# Patient Record
Sex: FEMALE | Race: WHITE | NOT HISPANIC OR LATINO | URBAN - METROPOLITAN AREA
[De-identification: names, ages, dates, MRNs, and addresses within clinical notes are randomized per-mention and may not be internally consistent; named-entity substitution may affect disease eponyms.]

---

## 2019-06-25 ENCOUNTER — INPATIENT (INPATIENT)
Facility: HOSPITAL | Age: 79
LOS: 5 days | Discharge: NURSING, REHAB OR LONG-TERM CARE | DRG: 689 | End: 2019-07-01
Attending: INTERNAL MEDICINE | Admitting: INTERNAL MEDICINE
Payer: MEDICARE

## 2019-06-25 VITALS
TEMPERATURE: 98 F | DIASTOLIC BLOOD PRESSURE: 86 MMHG | RESPIRATION RATE: 20 BRPM | SYSTOLIC BLOOD PRESSURE: 160 MMHG | HEART RATE: 78 BPM | WEIGHT: 110.01 LBS | OXYGEN SATURATION: 97 %

## 2019-06-25 DIAGNOSIS — Z98.890 OTHER SPECIFIED POSTPROCEDURAL STATES: Chronic | ICD-10-CM

## 2019-06-25 DIAGNOSIS — F32.9 MAJOR DEPRESSIVE DISORDER, SINGLE EPISODE, UNSPECIFIED: ICD-10-CM

## 2019-06-25 DIAGNOSIS — N30.00 ACUTE CYSTITIS WITHOUT HEMATURIA: ICD-10-CM

## 2019-06-25 DIAGNOSIS — Z98.890 OTHER SPECIFIED POSTPROCEDURAL STATES: ICD-10-CM

## 2019-06-25 DIAGNOSIS — R62.7 ADULT FAILURE TO THRIVE: ICD-10-CM

## 2019-06-25 DIAGNOSIS — R63.8 OTHER SYMPTOMS AND SIGNS CONCERNING FOOD AND FLUID INTAKE: ICD-10-CM

## 2019-06-25 DIAGNOSIS — H40.9 UNSPECIFIED GLAUCOMA: ICD-10-CM

## 2019-06-25 DIAGNOSIS — F03.90 UNSPECIFIED DEMENTIA WITHOUT BEHAVIORAL DISTURBANCE: ICD-10-CM

## 2019-06-25 DIAGNOSIS — Z29.9 ENCOUNTER FOR PROPHYLACTIC MEASURES, UNSPECIFIED: ICD-10-CM

## 2019-06-25 DIAGNOSIS — R41.0 DISORIENTATION, UNSPECIFIED: ICD-10-CM

## 2019-06-25 LAB
ALBUMIN SERPL ELPH-MCNC: 3.7 G/DL — SIGNIFICANT CHANGE UP (ref 3.3–5)
ALP SERPL-CCNC: 127 U/L — HIGH (ref 40–120)
ALT FLD-CCNC: 10 U/L — SIGNIFICANT CHANGE UP (ref 10–45)
ANION GAP SERPL CALC-SCNC: 14 MMOL/L — SIGNIFICANT CHANGE UP (ref 5–17)
APPEARANCE UR: CLEAR — SIGNIFICANT CHANGE UP
APTT BLD: 26 SEC — LOW (ref 27.5–36.3)
AST SERPL-CCNC: 21 U/L — SIGNIFICANT CHANGE UP (ref 10–40)
BACTERIA # UR AUTO: PRESENT /HPF
BASOPHILS # BLD AUTO: 0.06 K/UL — SIGNIFICANT CHANGE UP (ref 0–0.2)
BASOPHILS NFR BLD AUTO: 1 % — SIGNIFICANT CHANGE UP (ref 0–2)
BILIRUB SERPL-MCNC: 0.7 MG/DL — SIGNIFICANT CHANGE UP (ref 0.2–1.2)
BILIRUB UR-MCNC: NEGATIVE — SIGNIFICANT CHANGE UP
BUN SERPL-MCNC: 17 MG/DL — SIGNIFICANT CHANGE UP (ref 7–23)
CALCIUM SERPL-MCNC: 9.1 MG/DL — SIGNIFICANT CHANGE UP (ref 8.4–10.5)
CHLORIDE SERPL-SCNC: 104 MMOL/L — SIGNIFICANT CHANGE UP (ref 96–108)
CO2 SERPL-SCNC: 24 MMOL/L — SIGNIFICANT CHANGE UP (ref 22–31)
COLOR SPEC: YELLOW — SIGNIFICANT CHANGE UP
CREAT SERPL-MCNC: 1.37 MG/DL — HIGH (ref 0.5–1.3)
DIFF PNL FLD: NEGATIVE — SIGNIFICANT CHANGE UP
EOSINOPHIL # BLD AUTO: 0.39 K/UL — SIGNIFICANT CHANGE UP (ref 0–0.5)
EOSINOPHIL NFR BLD AUTO: 6.3 % — HIGH (ref 0–6)
EPI CELLS # UR: SIGNIFICANT CHANGE UP /HPF (ref 0–5)
FLU A RESULT: SIGNIFICANT CHANGE UP
FLU A RESULT: SIGNIFICANT CHANGE UP
FLUAV AG NPH QL: SIGNIFICANT CHANGE UP
FLUBV AG NPH QL: SIGNIFICANT CHANGE UP
GLUCOSE SERPL-MCNC: 100 MG/DL — HIGH (ref 70–99)
GLUCOSE UR QL: NEGATIVE — SIGNIFICANT CHANGE UP
HCT VFR BLD CALC: 38.9 % — SIGNIFICANT CHANGE UP (ref 34.5–45)
HGB BLD-MCNC: 13 G/DL — SIGNIFICANT CHANGE UP (ref 11.5–15.5)
IMM GRANULOCYTES NFR BLD AUTO: 0.2 % — SIGNIFICANT CHANGE UP (ref 0–1.5)
INR BLD: 0.98 — SIGNIFICANT CHANGE UP (ref 0.88–1.16)
KETONES UR-MCNC: NEGATIVE — SIGNIFICANT CHANGE UP
LEUKOCYTE ESTERASE UR-ACNC: ABNORMAL
LYMPHOCYTES # BLD AUTO: 1.84 K/UL — SIGNIFICANT CHANGE UP (ref 1–3.3)
LYMPHOCYTES # BLD AUTO: 29.9 % — SIGNIFICANT CHANGE UP (ref 13–44)
MAGNESIUM SERPL-MCNC: 1.9 MG/DL — SIGNIFICANT CHANGE UP (ref 1.6–2.6)
MCHC RBC-ENTMCNC: 31.3 PG — SIGNIFICANT CHANGE UP (ref 27–34)
MCHC RBC-ENTMCNC: 33.4 GM/DL — SIGNIFICANT CHANGE UP (ref 32–36)
MCV RBC AUTO: 93.7 FL — SIGNIFICANT CHANGE UP (ref 80–100)
MONOCYTES # BLD AUTO: 0.4 K/UL — SIGNIFICANT CHANGE UP (ref 0–0.9)
MONOCYTES NFR BLD AUTO: 6.5 % — SIGNIFICANT CHANGE UP (ref 2–14)
NEUTROPHILS # BLD AUTO: 3.46 K/UL — SIGNIFICANT CHANGE UP (ref 1.8–7.4)
NEUTROPHILS NFR BLD AUTO: 56.1 % — SIGNIFICANT CHANGE UP (ref 43–77)
NITRITE UR-MCNC: NEGATIVE — SIGNIFICANT CHANGE UP
NRBC # BLD: 0 /100 WBCS — SIGNIFICANT CHANGE UP (ref 0–0)
PH UR: 7 — SIGNIFICANT CHANGE UP (ref 5–8)
PLATELET # BLD AUTO: 165 K/UL — SIGNIFICANT CHANGE UP (ref 150–400)
POTASSIUM SERPL-MCNC: 3.5 MMOL/L — SIGNIFICANT CHANGE UP (ref 3.5–5.3)
POTASSIUM SERPL-SCNC: 3.5 MMOL/L — SIGNIFICANT CHANGE UP (ref 3.5–5.3)
PROT SERPL-MCNC: 6.9 G/DL — SIGNIFICANT CHANGE UP (ref 6–8.3)
PROT UR-MCNC: ABNORMAL MG/DL
PROTHROM AB SERPL-ACNC: 11.1 SEC — SIGNIFICANT CHANGE UP (ref 10–12.9)
RBC # BLD: 4.15 M/UL — SIGNIFICANT CHANGE UP (ref 3.8–5.2)
RBC # FLD: 14.6 % — HIGH (ref 10.3–14.5)
RBC CASTS # UR COMP ASSIST: < 5 /HPF — SIGNIFICANT CHANGE UP
RSV RESULT: SIGNIFICANT CHANGE UP
RSV RNA RESP QL NAA+PROBE: SIGNIFICANT CHANGE UP
SODIUM SERPL-SCNC: 142 MMOL/L — SIGNIFICANT CHANGE UP (ref 135–145)
SP GR SPEC: 1.01 — SIGNIFICANT CHANGE UP (ref 1–1.03)
UROBILINOGEN FLD QL: 0.2 E.U./DL — SIGNIFICANT CHANGE UP
WBC # BLD: 6.16 K/UL — SIGNIFICANT CHANGE UP (ref 3.8–10.5)
WBC # FLD AUTO: 6.16 K/UL — SIGNIFICANT CHANGE UP (ref 3.8–10.5)
WBC UR QL: ABNORMAL /HPF

## 2019-06-25 PROCEDURE — 93010 ELECTROCARDIOGRAM REPORT: CPT

## 2019-06-25 PROCEDURE — 70450 CT HEAD/BRAIN W/O DYE: CPT | Mod: 26

## 2019-06-25 PROCEDURE — 99285 EMERGENCY DEPT VISIT HI MDM: CPT | Mod: 25

## 2019-06-25 PROCEDURE — 71045 X-RAY EXAM CHEST 1 VIEW: CPT | Mod: 26

## 2019-06-25 RX ORDER — ONDANSETRON 8 MG/1
4 TABLET, FILM COATED ORAL ONCE
Refills: 0 | Status: COMPLETED | OUTPATIENT
Start: 2019-06-25 | End: 2019-06-25

## 2019-06-25 RX ORDER — ATORVASTATIN CALCIUM 80 MG/1
20 TABLET, FILM COATED ORAL
Qty: 0 | Refills: 0 | DISCHARGE

## 2019-06-25 RX ORDER — CEFTRIAXONE 500 MG/1
1000 INJECTION, POWDER, FOR SOLUTION INTRAMUSCULAR; INTRAVENOUS EVERY 24 HOURS
Refills: 0 | Status: COMPLETED | OUTPATIENT
Start: 2019-06-26 | End: 2019-06-27

## 2019-06-25 RX ORDER — ASPIRIN/CALCIUM CARB/MAGNESIUM 324 MG
0 TABLET ORAL
Qty: 0 | Refills: 0 | DISCHARGE

## 2019-06-25 RX ORDER — SODIUM CHLORIDE 9 MG/ML
1000 INJECTION INTRAMUSCULAR; INTRAVENOUS; SUBCUTANEOUS ONCE
Refills: 0 | Status: COMPLETED | OUTPATIENT
Start: 2019-06-25 | End: 2019-06-25

## 2019-06-25 RX ORDER — LATANOPROST 0.05 MG/ML
1 SOLUTION/ DROPS OPHTHALMIC; TOPICAL ONCE
Refills: 0 | Status: COMPLETED | OUTPATIENT
Start: 2019-06-25 | End: 2019-06-25

## 2019-06-25 RX ORDER — FLUOXETINE HCL 10 MG
50 CAPSULE ORAL DAILY
Refills: 0 | Status: DISCONTINUED | OUTPATIENT
Start: 2019-06-25 | End: 2019-07-01

## 2019-06-25 RX ORDER — SODIUM CHLORIDE 9 MG/ML
1000 INJECTION INTRAMUSCULAR; INTRAVENOUS; SUBCUTANEOUS
Refills: 0 | Status: DISCONTINUED | OUTPATIENT
Start: 2019-06-25 | End: 2019-06-27

## 2019-06-25 RX ORDER — ATORVASTATIN CALCIUM 80 MG/1
20 TABLET, FILM COATED ORAL AT BEDTIME
Refills: 0 | Status: DISCONTINUED | OUTPATIENT
Start: 2019-06-25 | End: 2019-07-01

## 2019-06-25 RX ORDER — LATANOPROST 0.05 MG/ML
1 SOLUTION/ DROPS OPHTHALMIC; TOPICAL
Qty: 0 | Refills: 0 | DISCHARGE

## 2019-06-25 RX ORDER — LATANOPROST 0.05 MG/ML
1 SOLUTION/ DROPS OPHTHALMIC; TOPICAL AT BEDTIME
Refills: 0 | Status: DISCONTINUED | OUTPATIENT
Start: 2019-06-25 | End: 2019-07-01

## 2019-06-25 RX ORDER — FLUOXETINE HCL 10 MG
0 CAPSULE ORAL
Qty: 0 | Refills: 0 | DISCHARGE

## 2019-06-25 RX ORDER — ASPIRIN/CALCIUM CARB/MAGNESIUM 324 MG
81 TABLET ORAL
Qty: 0 | Refills: 0 | DISCHARGE

## 2019-06-25 RX ORDER — CEFTRIAXONE 500 MG/1
1000 INJECTION, POWDER, FOR SOLUTION INTRAMUSCULAR; INTRAVENOUS ONCE
Refills: 0 | Status: COMPLETED | OUTPATIENT
Start: 2019-06-25 | End: 2019-06-25

## 2019-06-25 RX ORDER — ASPIRIN/CALCIUM CARB/MAGNESIUM 324 MG
81 TABLET ORAL DAILY
Refills: 0 | Status: DISCONTINUED | OUTPATIENT
Start: 2019-06-25 | End: 2019-07-01

## 2019-06-25 RX ORDER — ATORVASTATIN CALCIUM 80 MG/1
0 TABLET, FILM COATED ORAL
Qty: 0 | Refills: 0 | DISCHARGE

## 2019-06-25 RX ORDER — FLUOXETINE HCL 10 MG
50 CAPSULE ORAL
Qty: 0 | Refills: 0 | DISCHARGE

## 2019-06-25 RX ORDER — ONDANSETRON 8 MG/1
4 TABLET, FILM COATED ORAL ONCE
Refills: 0 | Status: DISCONTINUED | OUTPATIENT
Start: 2019-06-25 | End: 2019-06-25

## 2019-06-25 RX ADMIN — ONDANSETRON 4 MILLIGRAM(S): 8 TABLET, FILM COATED ORAL at 07:52

## 2019-06-25 RX ADMIN — Medication 1 MILLIGRAM(S): at 07:57

## 2019-06-25 RX ADMIN — Medication 50 MILLIGRAM(S): at 13:51

## 2019-06-25 RX ADMIN — ATORVASTATIN CALCIUM 20 MILLIGRAM(S): 80 TABLET, FILM COATED ORAL at 23:22

## 2019-06-25 RX ADMIN — CEFTRIAXONE 1000 MILLIGRAM(S): 500 INJECTION, POWDER, FOR SOLUTION INTRAMUSCULAR; INTRAVENOUS at 09:50

## 2019-06-25 RX ADMIN — CEFTRIAXONE 100 MILLIGRAM(S): 500 INJECTION, POWDER, FOR SOLUTION INTRAMUSCULAR; INTRAVENOUS at 09:21

## 2019-06-25 RX ADMIN — LATANOPROST 1 DROP(S): 0.05 SOLUTION/ DROPS OPHTHALMIC; TOPICAL at 13:46

## 2019-06-25 RX ADMIN — Medication 81 MILLIGRAM(S): at 13:47

## 2019-06-25 RX ADMIN — SODIUM CHLORIDE 2000 MILLILITER(S): 9 INJECTION INTRAMUSCULAR; INTRAVENOUS; SUBCUTANEOUS at 07:52

## 2019-06-25 NOTE — ED PROVIDER NOTE - OBJECTIVE STATEMENT
77 y/o female presents to the ED by ambulance complaining of non-bloody diarrhea, nausea and generalized weakness. Pt was agitated upon arrival and unable to give a clear history; only with complaints of nausea and notes "feeling awful".

## 2019-06-25 NOTE — H&P ADULT - NSHPSOCIALHISTORY_GEN_ALL_CORE
Current smoker, 1-2 cigarettes/day, ~100 pack year history  Denies alcohol use  Denies illicit drug use

## 2019-06-25 NOTE — ED ADULT TRIAGE NOTE - CHIEF COMPLAINT QUOTE
pt BIBA from home complaining of generalized weakness nausea without vomiting and constipation for unknown amount of time states whole body hurts cannot stand due to pain but denies falls. Pt denies CP SOB is anxious and refusing to answer further questions because she feels sick

## 2019-06-25 NOTE — H&P ADULT - NSHPPHYSICALEXAM_GEN_ALL_CORE
VITAL SIGNS:  T(C): 36.7 (06-25-19 @ 08:54), Max: 36.7 (06-25-19 @ 08:54)  T(F): 98 (06-25-19 @ 08:54), Max: 98 (06-25-19 @ 08:54)  HR: 63 (06-25-19 @ 08:54) (63 - 78)  BP: 157/87 (06-25-19 @ 08:54) (157/87 - 160/86)  BP(mean): --  RR: 18 (06-25-19 @ 08:54) (18 - 20)  SpO2: 95% (06-25-19 @ 08:54) (95% - 97%)  Wt(kg): --    PHYSICAL EXAM:  Constitutional: WDWN resting comfortably in bed; NAD, elderly, thin  Head: NC/AT  Eyes: PERRL, EOMI, anicteric sclera  ENT: no nasal discharge; uvula midline, no oropharyngeal erythema or exudates; dry MM, poor dentition  Neck: supple; no JVD or thyromegaly  Respiratory: CTA B/L; no W/R/R, no retractions, poor air entry  Cardiac: +S1/S2; RRR; no M/R/G; PMI non-displaced  Gastrointestinal: abdomen soft and thin, LLQ tenderness, ND; no rebound or guarding; +BSx4  Back: spine midline, no bony tenderness or step-offs; no CVAT B/L  Extremities: WWP, no clubbing or cyanosis; no peripheral edema  Musculoskeletal: NROM x4; no joint swelling, tenderness or erythema  Vascular: 2+ radial, DP/PT pulses B/L  Dermatologic: skin warm, dry and intact; no rashes, wounds, or scars  Lymphatic: no submandibular or cervical LAD  Neurologic: AAOx2 (person and place, oriented to president; CNII-XII grossly intact; no focal deficits, strength 4+/5 in b/l UE and LE  Psychiatric: affect and characteristics of appearance, verbalizations, behaviors are appropriate

## 2019-06-25 NOTE — H&P ADULT - PROBLEM SELECTOR PLAN 5
Patient has known history of dementia that is worsening per pt's nephew. Pt is A&Ox2 at baseline. Lives at home and has HHA 5 hours a day for 7 days.  - SW consult

## 2019-06-25 NOTE — H&P ADULT - PROBLEM SELECTOR PLAN 3
Pt's nephew reports poor PO intake and weight loss in the patient. Nephew reports that she eats very little. Pt has dry mucous membranes and is thin on exam.  - Nutrition consult  - start NS @ 75cc/hr

## 2019-06-25 NOTE — H&P ADULT - NSICDXPASTMEDICALHX_GEN_ALL_CORE_FT
PAST MEDICAL HISTORY:  Anxiety     Cataract     Dementia     Depression     Glaucoma     HLD (hyperlipidemia)     Tobacco use disorder

## 2019-06-25 NOTE — H&P ADULT - HISTORY OF PRESENT ILLNESS
Patient is a 78yoF current smoker with pmhx dementia, depression, anxiety, suicided attempt in 2016, HLD, cataracts and glaucoma who presents from home, BIBEMS to [facility] ED for weakness and nausea. Patient's nephew (Willian Hamilton, HCP) is at bedside giving patient's history. Pt states that she was feeling awful and having nausea at home. Nephew reports that when HHA was home, pt had episode of diarrhea then had a few more episodes later in the day after HHA left. Nephew states that the pt called him and told him about her symptoms and he told her to call EMS to bring her to the ED. Nephew reports that the pt has been declining in the past few years and especially since she was hospitalized at [facility] in August for constipation, malnutrition and dehydration. Nephew also reports that pt had been in a nursing home then went home with a 5 hour a day HHA. Nephew does not feel that the pt has enough attention at home and is concerned that she needs more help. Nephew also reports that she has a poor appetite and limited PO intake, eating mostly cheese sandwiches at home.    In the ED, vitals were T 98, HR 63, /87, RR 18, spO2 95% on RA. Labs were remarkable for Eosinophils 6.3%, Cr 1.37, Alk Phos 127, UA with trace protein, large LE, many WBCs and bacteria. CTH was negative for acute hemorrhage or infarct. Pt was given 1g ceftriaxone, 1mg Ativan for agitation 4mg Zofran and 1L NS.

## 2019-06-25 NOTE — ED PROVIDER NOTE - PROGRESS NOTE DETAILS
Able to contact nephew, Willian Hamilton and obtained rest of her history.  As per nephew, patient has history of with PMHx of anxiety, cataracts, dementia, depression, glaucoma, HLD, and tobacco use disorder; nephew states pt experienced 2-3 episodes of diarrhea over the past few days as well as general weakness. Nephew states he went to visit patient at which time was complaining of generalized weakness. Patient has health care provider at home five hours a day for seven days; she does not have 24 hour help at home. Able to contact nephew, Willian Hamilton and obtained rest of her history.  As per nephew, patient has history of with PMHx of anxiety, cataracts, dementia, depression, glaucoma, HLD, and tobacco use disorder; nephew states pt experienced 2-3 episodes of nb diarrhea over the past few days as well as general weakness. Nephew states he went to visit patient at which time was complaining of generalized weakness. Patient has health care provider at home five hours a day for seven days; she does not have 24 hour help at home.

## 2019-06-25 NOTE — ED ADULT NURSE NOTE - OBJECTIVE STATEMENT
Patient presents to the ED with c/o severe nausea as well as other unspecified complaints. Tearful presentation with irritability during interview. No active emesis. States "im going to have another accident, I have had a couple over the last few days. Unable to obtain specific details. MD at bedside. NAD Noted

## 2019-06-25 NOTE — PROGRESS NOTE ADULT - SUBJECTIVE AND OBJECTIVE BOX
78 y.o female depression anxiety oms cigs htn chol cva carotid endarterectomy admitted with uti started on iv antibioics hemodynamically stable alert comfortable agitation noted lungs clear heart s1s2 2/6 shanel lpsb and apex bp 130/80 will require social service for placement in snf discussed with family and staff

## 2019-06-25 NOTE — PROVIDER CONTACT NOTE (OTHER) - SITUATION
Patient is a new admission from the ED. Patient stated that she wants to hurt herself and wants to hurt others but does not have a plan.

## 2019-06-25 NOTE — H&P ADULT - PROBLEM SELECTOR PLAN 7
Nephew reports remote history of endarterectomy. Pt takes ASA 81mg and Atorvastatin 20mg at home.  - c/w home meds

## 2019-06-25 NOTE — H&P ADULT - ASSESSMENT
Patient is a 78yoF current smoker with pmhx dementia, depression, anxiety, suicided attempt in 2016, HLD, cataracts and glaucoma who presents from home, BIBEMS to [facility] ED for weakness and nausea. Pt admitted for management of UTI.

## 2019-06-25 NOTE — H&P ADULT - NSHPLABSRESULTS_GEN_ALL_CORE
LABS:                         13.0   6.16  )-----------( 165      ( 2019 07:34 )             38.9     142  |  104  |  17  ----------------------------<  100<H>  3.5   |  24  |  1.37<H>  Ca    9.1      2019 07:34  Mg     1.9       TPro  6.9  /  Alb  3.7  /  TBili  0.7  /  DBili  x   /  AST  21  /  ALT  10  /  AlkPhos  127<H>    PT/INR - ( 2019 07:34 )   PT: 11.1 sec;   INR: 0.98     PTT - ( 2019 07:34 )  PTT:26.0 sec    Urinalysis Basic - ( 2019 07:48 )  Color: Yellow / Appearance: Clear / S.010 / pH: x  Gluc: x / Ketone: NEGATIVE  / Bili: Negative / Urobili: 0.2 E.U./dL   Blood: x / Protein: Trace mg/dL / Nitrite: NEGATIVE   Leuk Esterase: Large / RBC: < 5 /HPF / WBC Many /HPF   Sq Epi: x / Non Sq Epi: 0-5 /HPF / Bacteria: Present /HPF      CARDIAC MARKERS ( 2019 07:34 )  x     / <0.01 ng/mL / 34 U/L / x     / 2.0 ng/mL    RADIOLOGY, EKG & ADDITIONAL TESTS: Reviewed.   < from: CT Head No Cont (19 @ 08:38) >  IMPRESSION: No intracranial hemorrhage or acute transcortical infarct.   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 Old lacunar   infarct within the right caudate head.

## 2019-06-25 NOTE — ED PROVIDER NOTE - CLINICAL SUMMARY MEDICAL DECISION MAKING FREE TEXT BOX
77 y/o female with PMHx of anxiety, dementia, HLD  presents with non-bloody diarrhea, nausea and generalized weakness. Patient initially agitated and uncooperative; history obtained after discussion with nephew. Administered IV antibiotics for UTI and given Ativan for agitation. Patient admitted after discussion with nephew for UTI and possible placement due to home conditions. 79 y/o female with PMHx of anxiety, dementia, HLD  presents with non-bloody diarrhea, nausea and generalized weakness. Patient initially agitated and uncooperative; history obtained after discussion with nephew. Administered IV antibiotics for UTI and given po Ativan for agitation. Patient admitted for UTI and possible placement. Stable in ED.

## 2019-06-25 NOTE — H&P ADULT - PROBLEM SELECTOR PLAN 4
Patient has history of depression. Nephew reports that pt had suicide attempt by overdose in 2016 for which she was hospitalized for 2 weeks. Patient currently takes Przac 50mg at home. No SI/HI.  - c/w home Prozac 50mg

## 2019-06-25 NOTE — H&P ADULT - PROBLEM SELECTOR PLAN 2
Patient presented with confusion in the setting of known dementia. Pt has returned to baseline mental status, per pt's nephew at bedside. CTH negative for acute hemorrhage or infarct.  - continue to monitor mental status

## 2019-06-25 NOTE — H&P ADULT - PROBLEM SELECTOR PLAN 1
Patient presented with confusion and nausea. Pt denies dysuria or increased urinary frequency. Found to have UTI on UA. S/p 1L NS and 1g CTX in the ED.  - c/w 1g CTX [id number] Patient presented with confusion and nausea. Pt denies dysuria or increased urinary frequency. Found to have UTI on UA. S/p 1L NS and 1g CTX in the ED.  - c/w 1g CTX [id number]  - f/u urine cx

## 2019-06-26 DIAGNOSIS — Z91.89 OTHER SPECIFIED PERSONAL RISK FACTORS, NOT ELSEWHERE CLASSIFIED: ICD-10-CM

## 2019-06-26 LAB
ALBUMIN SERPL ELPH-MCNC: 3 G/DL — LOW (ref 3.3–5)
ALP SERPL-CCNC: 100 U/L — SIGNIFICANT CHANGE UP (ref 40–120)
ALT FLD-CCNC: 8 U/L — LOW (ref 10–45)
ANION GAP SERPL CALC-SCNC: 9 MMOL/L — SIGNIFICANT CHANGE UP (ref 5–17)
AST SERPL-CCNC: 13 U/L — SIGNIFICANT CHANGE UP (ref 10–40)
BILIRUB SERPL-MCNC: 0.5 MG/DL — SIGNIFICANT CHANGE UP (ref 0.2–1.2)
BUN SERPL-MCNC: 17 MG/DL — SIGNIFICANT CHANGE UP (ref 7–23)
CALCIUM SERPL-MCNC: 8.3 MG/DL — LOW (ref 8.4–10.5)
CHLORIDE SERPL-SCNC: 108 MMOL/L — SIGNIFICANT CHANGE UP (ref 96–108)
CO2 SERPL-SCNC: 26 MMOL/L — SIGNIFICANT CHANGE UP (ref 22–31)
CREAT SERPL-MCNC: 1.29 MG/DL — SIGNIFICANT CHANGE UP (ref 0.5–1.3)
GLUCOSE SERPL-MCNC: 88 MG/DL — SIGNIFICANT CHANGE UP (ref 70–99)
HCT VFR BLD CALC: 31.4 % — LOW (ref 34.5–45)
HGB BLD-MCNC: 10.5 G/DL — LOW (ref 11.5–15.5)
MAGNESIUM SERPL-MCNC: 1.7 MG/DL — SIGNIFICANT CHANGE UP (ref 1.6–2.6)
MCHC RBC-ENTMCNC: 31.4 PG — SIGNIFICANT CHANGE UP (ref 27–34)
MCHC RBC-ENTMCNC: 33.4 GM/DL — SIGNIFICANT CHANGE UP (ref 32–36)
MCV RBC AUTO: 94 FL — SIGNIFICANT CHANGE UP (ref 80–100)
NRBC # BLD: 0 /100 WBCS — SIGNIFICANT CHANGE UP (ref 0–0)
PLATELET # BLD AUTO: 137 K/UL — LOW (ref 150–400)
POTASSIUM SERPL-MCNC: 3.1 MMOL/L — LOW (ref 3.5–5.3)
POTASSIUM SERPL-SCNC: 3.1 MMOL/L — LOW (ref 3.5–5.3)
PROT SERPL-MCNC: 5.7 G/DL — LOW (ref 6–8.3)
RBC # BLD: 3.34 M/UL — LOW (ref 3.8–5.2)
RBC # FLD: 14.8 % — HIGH (ref 10.3–14.5)
SODIUM SERPL-SCNC: 143 MMOL/L — SIGNIFICANT CHANGE UP (ref 135–145)
WBC # BLD: 5.47 K/UL — SIGNIFICANT CHANGE UP (ref 3.8–10.5)
WBC # FLD AUTO: 5.47 K/UL — SIGNIFICANT CHANGE UP (ref 3.8–10.5)

## 2019-06-26 RX ORDER — MAGNESIUM SULFATE 500 MG/ML
4 VIAL (ML) INJECTION ONCE
Refills: 0 | Status: COMPLETED | OUTPATIENT
Start: 2019-06-26 | End: 2019-06-26

## 2019-06-26 RX ORDER — POTASSIUM CHLORIDE 20 MEQ
10 PACKET (EA) ORAL
Refills: 0 | Status: COMPLETED | OUTPATIENT
Start: 2019-06-26 | End: 2019-06-26

## 2019-06-26 RX ORDER — POTASSIUM CHLORIDE 20 MEQ
40 PACKET (EA) ORAL ONCE
Refills: 0 | Status: COMPLETED | OUTPATIENT
Start: 2019-06-26 | End: 2019-06-26

## 2019-06-26 RX ADMIN — Medication 100 MILLIEQUIVALENT(S): at 10:57

## 2019-06-26 RX ADMIN — LATANOPROST 1 DROP(S): 0.05 SOLUTION/ DROPS OPHTHALMIC; TOPICAL at 00:02

## 2019-06-26 RX ADMIN — Medication 81 MILLIGRAM(S): at 12:24

## 2019-06-26 RX ADMIN — Medication 50 MILLIGRAM(S): at 12:24

## 2019-06-26 RX ADMIN — CEFTRIAXONE 100 MILLIGRAM(S): 500 INJECTION, POWDER, FOR SOLUTION INTRAMUSCULAR; INTRAVENOUS at 09:01

## 2019-06-26 RX ADMIN — Medication 100 GRAM(S): at 13:32

## 2019-06-26 RX ADMIN — Medication 100 MILLIEQUIVALENT(S): at 09:36

## 2019-06-26 RX ADMIN — LATANOPROST 1 DROP(S): 0.05 SOLUTION/ DROPS OPHTHALMIC; TOPICAL at 22:05

## 2019-06-26 RX ADMIN — ATORVASTATIN CALCIUM 20 MILLIGRAM(S): 80 TABLET, FILM COATED ORAL at 22:05

## 2019-06-26 RX ADMIN — Medication 40 MILLIEQUIVALENT(S): at 09:17

## 2019-06-26 RX ADMIN — Medication 100 MILLIEQUIVALENT(S): at 12:24

## 2019-06-26 NOTE — CHART NOTE - NSCHARTNOTEFT_GEN_A_CORE
Upon Nutritional Assessment by the Registered Dietitian your patient was determined to meet criteria / has evidence of the following diagnosis/diagnoses:          [ ]  Mild Protein Calorie Malnutrition        [ ]  Moderate Protein Calorie Malnutrition        [ x] Severe Protein Calorie Malnutrition        [ ] Unspecified Protein Calorie Malnutrition        [ ] Underweight / BMI <19        [ ] Morbid Obesity / BMI > 40    Per physical assessment: Muscle Wasting- Temporal [   ]  Clavicle/Pectoral [ x  ]  Shoulder/Deltoid [  x ]  Scapula [   ]  Interosseous [ x  ]  Quadriceps [   ]  Gastrocnemius [   ]; Fat Wasting- Orbital [   ]  Buccal [   ]  Triceps [x   ]  Rib [   ]--> Suspect severe PCM 2/2 to physical assessment, <75% EER being met >1 month, and suspected wt loss; please see malnutrition chart note.    Findings as based on:  •  Comprehensive nutrition assessment and consultation    Treatment:    The following diet has been recommended:    Soft + Ensure Enlive TID (1050 kcal, 60g protein, 540mL free H2O)     PROVIDER Section:     By signing this assessment you are acknowledging and agree with the diagnosis/diagnoses assigned by the Registered Dietitian    Comments:

## 2019-06-26 NOTE — DIETITIAN INITIAL EVALUATION ADULT. - MALNUTRITION
Per physical assessment: Muscle Wasting- Temporal [   ]  Clavicle/Pectoral [ x  ]  Shoulder/Deltoid [  x ]  Scapula [   ]  Interosseous [ x  ]  Quadriceps [   ]  Gastrocnemius [   ]; Fat Wasting- Orbital [   ]  Buccal [   ]  Triceps [x   ]  Rib [   ]--> Suspect severe PCM 2/2 to physical assessment, <75% EER being met >1 month, and suspected wt loss; please see malnutrition chart note. severe

## 2019-06-26 NOTE — DIETITIAN INITIAL EVALUATION ADULT. - PROBLEM SELECTOR PLAN 1
Patient presented with confusion and nausea. Pt denies dysuria or increased urinary frequency. Found to have UTI on UA. S/p 1L NS and 1g CTX in the ED.  - c/w 1g CTX [id number]  - f/u urine cx

## 2019-06-26 NOTE — DIETITIAN INITIAL EVALUATION ADULT. - OTHER INFO
78F admitted for management of UTI, pt is current smoker with pmhx dementia, depression, anxiety, suicide attempt in 2016, HLD, cataracts and glaucoma who presents from home, BIBEMS to [facility] ED for weakness and nausea. Pt states that she was feeling awful and having nausea at home. Nephew reports that when HHA was home, pt had episode of diarrhea then had a few more episodes later in the day after HHA left. Nephew feels not enough attention at home and concerned she requires more help than she is currently receiving, additionally reports poor appetite at home/ FTT, baseline mental status AAOx2. Pt observed in bed, curled up in ball, did not awaken to name, per PCA pt stating she still lacks appetite, only consuming coffee for lunch. Nephew not present at time of assessment however per visual assessment pt with noteable fat/ muscle wasting, see chart note for PCM. No noted n/v/c, diarrhea noted, prefers soft diet, skin intact, no pain noted. NKFA. Suspect wt loss however pt unable to state UBW. Recommend Ensure Enlive TID (1050 kcal, 60g protein, 540mL free H2O) to further meet needs, appetite stimulant and encouragement/ assistance at meals. Will continue to follow per protocol.

## 2019-06-26 NOTE — PHYSICAL THERAPY INITIAL EVALUATION ADULT - GAIT DEVIATIONS NOTED, PT EVAL
decreased shahab/dec toe clearance, shuffling gait/decreased weight-shifting ability/decreased step length

## 2019-06-26 NOTE — DIETITIAN INITIAL EVALUATION ADULT. - ADD RECOMMEND
1. Encourage intake through day 2. Assist with feeds prn 3. Manage pain prn 4. Add Ensure Enlive TID (1050 kcal, 60g protein, 540mL free H2O)

## 2019-06-26 NOTE — PROGRESS NOTE ADULT - SUBJECTIVE AND OBJECTIVE BOX
77 y/o female htn cva carotid endarterectomy oms agitation uti on antibiotics clinically improving lungs clear heart s1s2 2/6 shanel lpsb and apex bp 140/80 neuro to see social service plan for snf

## 2019-06-26 NOTE — CONSULT NOTE ADULT - SUBJECTIVE AND OBJECTIVE BOX
Patient is a 78y old  Female who presents with a chief complaint of UTI (2019 09:04)       HPI:  Patient is a 78yoF current smoker with pmhx dementia, depression, anxiety, suicided attempt in 2016, HLD, cataracts and glaucoma who presents from home, BIBEMS to [facility] ED for weakness and nausea. Patient's nephew (Willian Hamilton, HCP) is at bedside giving patient's history. Pt states that she was feeling awful and having nausea at home. Nephew reports that when HHA was home, pt had episode of diarrhea then had a few more episodes later in the day after HHA left. Nephew states that the pt called him and told him about her symptoms and he told her to call EMS to bring her to the ED. Nephew reports that the pt has been declining in the past few years and especially since she was hospitalized at [facility] in August for constipation, malnutrition and dehydration. Nephew also reports that pt had been in a nursing home then went home with a 5 hour a day HHA. Nephew does not feel that the pt has enough attention at home and is concerned that she needs more help. Nephew also reports that she has a poor appetite and limited PO intake, eating mostly cheese sandwiches at home.    In the ED, vitals were T 98, HR 63, /87, RR 18, spO2 95% on RA. Labs were remarkable for Eosinophils 6.3%, Cr 1.37, Alk Phos 127, UA with trace protein, large LE, many WBCs and bacteria. CTH was negative for acute hemorrhage or infarct. Pt was given 1g ceftriaxone, 1mg Ativan for agitation 4mg Zofran and 1L NS. (2019 11:38)      PAST MEDICAL & SURGICAL HISTORY:  Glaucoma  Cataract  Tobacco use disorder  HLD (hyperlipidemia)  Anxiety  Depression  Dementia  H/O endarterectomy      MEDICATIONS  (STANDING):  aspirin enteric coated 81 milliGRAM(s) Oral daily  atorvastatin 20 milliGRAM(s) Oral at bedtime  cefTRIAXone   IVPB 1000 milliGRAM(s) IV Intermittent every 24 hours  FLUoxetine 50 milliGRAM(s) Oral daily  latanoprost 0.005% Ophthalmic Solution 1 Drop(s) Both EYES at bedtime  magnesium sulfate  IVPB 4 Gram(s) IV Intermittent once  potassium chloride  10 mEq/100 mL IVPB 10 milliEquivalent(s) IV Intermittent every 1 hour  sodium chloride 0.9%. 1000 milliLiter(s) (75 mL/Hr) IV Continuous <Continuous>    MEDICATIONS  (PRN):      Social History: lives alone in an elevator accessible apartment building, has HHA x 5 hrs/day    Functional Level Prior to Admission: states she is ADl independent, walks with a cane/walker    FAMILY HISTORY:  FH: lung cancer  FH: diabetes mellitus      CBC Full  -  ( 2019 06:31 )  WBC Count : 5.47 K/uL  RBC Count : 3.34 M/uL  Hemoglobin : 10.5 g/dL  Hematocrit : 31.4 %  Platelet Count - Automated : 137 K/uL  Mean Cell Volume : 94.0 fl  Mean Cell Hemoglobin : 31.4 pg  Mean Cell Hemoglobin Concentration : 33.4 gm/dL  Auto Neutrophil # : x  Auto Lymphocyte # : x  Auto Monocyte # : x  Auto Eosinophil # : x  Auto Basophil # : x  Auto Neutrophil % : x  Auto Lymphocyte % : x  Auto Monocyte % : x  Auto Eosinophil % : x  Auto Basophil % : x          143  |  108  |  17  ----------------------------<  88  3.1<L>   |  26  |  1.29    Ca    8.3<L>      2019 06:31  Mg     1.7         TPro  5.7<L>  /  Alb  3.0<L>  /  TBili  0.5  /  DBili  x   /  AST  13  /  ALT  8<L>  /  AlkPhos  100        Urinalysis Basic - ( 2019 07:48 )    Color: Yellow / Appearance: Clear / S.010 / pH: x  Gluc: x / Ketone: NEGATIVE  / Bili: Negative / Urobili: 0.2 E.U./dL   Blood: x / Protein: Trace mg/dL / Nitrite: NEGATIVE   Leuk Esterase: Large / RBC: < 5 /HPF / WBC Many /HPF   Sq Epi: x / Non Sq Epi: 0-5 /HPF / Bacteria: Present /HPF          Radiology:    < from: Xray Chest 1 View-PORTABLE IMMEDIATE (19 @ 07:56) >  EXAM:  XR CHEST PORTABLE IMMED 1V                          PROCEDURE DATE:  2019          INTERPRETATION:    Chest x-ray    Indication: Cough    Prior studies: None    Single AP view of the chest is submitted.  Heart size is mildly enlarged.   Slightly prominent interstitial markings. No focal pulmonary   consolidation is seen.  No pleural effusion or pneumothorax is identified.    Impression: Mild cardiomegaly. Prominent interstitial markings. No focal   consolidation.        < from: CT Head No Cont (19 @ 08:38) >    EXAM:  CT BRAIN                          PROCEDURE DATE:  2019          INTERPRETATION:  INDICATIONS: Weakness    TECHNIQUE: Serial axial images were obtained from the skull base to the   vertex without the use of intravenous contrast. Sagittal and coronal   reformats were also reviewed.    COMPARISON EXAMINATION: None.    FINDINGS:    VENTRICLES AND SULCI: There is mild to moderate parenchymal volume loss   with ex vacuo dilatation of the ventricles.  INTRA-AXIAL: No mass effect, acute hemorrhage, or midline shift. There is   preservation of gray-white matter differentiation without CT evidence of   acute transcortical infarction. There is a wedge-shaped area of   encephalomalacia changes within the posterior right parietal lobe withex   vacuo dilatation of the right atria and occipital horn compatible with   previous infarct. There also is a smaller encephalomalacia focus within   the anterior right frontal lobe compatible with additional infarct. There   is a 5 mm hypodensity within the right caudate head compatible with a old   lacunar infarct. In addition, there are patchy hypodensities within the   cerebral white matter consistent with small vessel ischemic disease.  EXTRA-AXIAL: No extra-axial fluid collection is present.   VISUALIZED SINUSES: Imaged portions of paranasal sinuses are well aerated.  VISUALIZED MASTOIDS: Well-aerated.  CALVARIUM: No acute fracture.    IMPRESSION: No intracranial hemorrhage or acute transcortical infarct.   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 Old lacunar   infarct within the right caudate head.              Vital Signs Last 24 Hrs  T(C): 36.8 (2019 08:57), Max: 36.9 (2019 14:34)  T(F): 98.3 (2019 08:57), Max: 98.5 (2019 14:34)  HR: 62 (2019 08:57) (62 - 75)  BP: 132/75 (2019 08:57) (130/77 - 155/86)  BP(mean): --  RR: 18 (2019 08:57) (16 - 18)  SpO2: 95% (2019 08:57) (95% - 98%)    REVIEW OF SYSTEMS:    CONSTITUTIONAL: fatigue  EYES: No eye pain, visual disturbances, or discharge  ENMT:  No difficulty hearing, tinnitus, vertigo; No sinus or throat pain  NECK: No pain or stiffness  BREASTS: No pain, masses, or nipple discharge  RESPIRATORY: No cough, wheezing, chills or hemoptysis; No shortness of breath  CARDIOVASCULAR: No chest pain, palpitations, dizziness, or leg swelling  GASTROINTESTINAL: No abdominal or epigastric pain. No nausea, vomiting, or hematemesis; No diarrhea or constipation. No melena or hematochezia.  GENITOURINARY: No dysuria, frequency, hematuria, or incontinence  NEUROLOGICAL: No headaches, memory loss, loss of strength, numbness, or tremors  SKIN: No itching, burning, rashes, or lesions   LYMPH NODES: No enlarged glands  ENDOCRINE: No heat or cold intolerance; No hair loss  MUSCULOSKELETAL: No joint pain or swelling; No muscle, back, or extremity pain  PSYCHIATRIC: No depression, anxiety, mood swings, or difficulty sleeping  HEME/LYMPH: No easy bruising, or bleeding gums  ALLERGY AND IMMUNOLOGIC: No hives or eczema  VASCULAR: no swelling, erythema      Physical Exam: 77 yo  woman lying in semi Stockton's position, c/o feeling weak    Head: normocephalic, atraumatic    Eyes: PERRLA, EOMI, no nystagmus, sclera anicteric    ENT: nasal discharge, uvula midline, no oropharyngeal erythema/exudate    Neck: supple, negative JVD, negative carotid bruits, no thyromegaly    Chest: CTA bilaterally, neg wheeze, rhonchi, rales, crackles, egophany    Cardiovascular: regular rate and rhythm, II/VI ELY    Abdomen: soft, non distended, non tender, negative rebound/guarding, normal bowel sounds, neg hepatosplenomegaly    Extremities: WWP, neg cyanosis/clubbing/edema, negative calf tenderness to palpation, negative Cj's sign    :     Neurologic Exam:    Alert and oriented x 2to person, place, speech fluent w/o dysarthria  Cranial Nerves:     II:                       pupils equal, round and reactive to light, visual fields intact   III/ IV/VI:             extraocular movements intact, neg nystagmus, neg ptosis  V:                       facial sensation intact, V1-3 normal  VII:                     face symmetric, no droop, normal eye closure and smile  VIII:                    hearing intact to finger rub bilaterally  IX/ X:                 soft palate rise symmetrical  XI:                      head turning, shoulder shrug normal  XII:                     tongue midline    Motor Exam:    Upper Extremities:     RIght:     poor effort > 3/5               negative drift    Left :      poor effort > 3/5               negative drift    Lower Extremities:                 Right:       poor effort > 3/5                 Left:         poor effort > 3/5                   Sensory:    intact to LT/PP in all UE/LE dermatomes    DTR:            = biceps/     triceps/     brachioradialis                      = patella/   medial hamstring/ankle                      neg clonus                      neg Babinski                          Gait:  not tested        PM&R Impression:    1) deconditioned  2) no focal weakness  3) UTI        Recommendations:    1) Physical therapy focusing on therapeutic exercises, bed mobility/transfer out of bed evaluation, progressive ambulation with assistive devices prn.    2) Anticipated Disposition Plan/Recs: pending functional progress

## 2019-06-26 NOTE — PROGRESS NOTE ADULT - PROBLEM SELECTOR PLAN 1
Patient presented with confusion and nausea. Pt denies dysuria or increased urinary frequency. Found to have UTI on UA. S/p 1L NS and 1g CTX in the ED.  - c/w 1g CTX q24h  - f/u urine cx

## 2019-06-26 NOTE — CONSULT NOTE ADULT - SUBJECTIVE AND OBJECTIVE BOX
Patient is a 78y old  Female who presents with a chief complaint of UTI (26 Jun 2019 07:41)        HPI:  Patient is a 78yoF current smoker with pmhx dementia, depression, anxiety, suicided attempt in 2016, HLD, cataracts and glaucoma who presents from home, BIBEMS to [facility] ED for weakness and nausea. Patient's nephew (Willian Hamilton, HCP) is at bedside giving patient's history. Pt states that she was feeling awful and having nausea at home. Nephew reports that when HHA was home, pt had episode of diarrhea then had a few more episodes later in the day after HHA left. Nephew states that the pt called him and told him about her symptoms and he told her to call EMS to bring her to the ED. Nephew reports that the pt has been declining in the past few years and especially since she was hospitalized at [facility] in August for constipation, malnutrition and dehydration. Nephew also reports that pt had been in a nursing home then went home with a 5 hour a day HHA. Nephew does not feel that the pt has enough attention at home and is concerned that she needs more help. Nephew also reports that she has a poor appetite and limited PO intake, eating mostly cheese sandwiches at home.    In the ED, vitals were T 98, HR 63, /87, RR 18, spO2 95% on RA. Labs were remarkable for Eosinophils 6.3%, Cr 1.37, Alk Phos 127, UA with trace protein, large LE, many WBCs and bacteria. CTH was negative for acute hemorrhage or infarct. Pt was given 1g ceftriaxone, 1mg Ativan for agitation 4mg Zofran and 1L NS. (25 Jun 2019 11:38)    Memory loss - dementia   Allergies  penicillin (Rash)      Health Issues  URINARY TRACT INFECTION;CONFUSION  Yes  FH: lung cancer  FH: diabetes mellitus  Handoff  MEWS Score  Glaucoma  Cataract  Tobacco use disorder  HLD (hyperlipidemia)  Anxiety  Depression  Dementia  Urinary tract infection  Prophylactic measure  Nutrition, metabolism, and development symptoms  History of endarterectomy  Glaucoma  Dementia  Depression  Failure to thrive in adult  Confusion  Acute cystitis without hematuria  H/O endarterectomy  MULITPLE COMPLAINTS  Confusion        FAMILY HISTORY:  FH: lung cancer  FH: diabetes mellitus      MEDICATIONS  (STANDING):  aspirin enteric coated 81 milliGRAM(s) Oral daily  atorvastatin 20 milliGRAM(s) Oral at bedtime  cefTRIAXone   IVPB 1000 milliGRAM(s) IV Intermittent every 24 hours  FLUoxetine 50 milliGRAM(s) Oral daily  latanoprost 0.005% Ophthalmic Solution 1 Drop(s) Both EYES at bedtime  magnesium sulfate  IVPB 4 Gram(s) IV Intermittent once  potassium chloride   Powder 40 milliEquivalent(s) Oral once  potassium chloride  10 mEq/100 mL IVPB 10 milliEquivalent(s) IV Intermittent every 1 hour  sodium chloride 0.9%. 1000 milliLiter(s) (75 mL/Hr) IV Continuous <Continuous>    MEDICATIONS  (PRN):      PAST MEDICAL & SURGICAL HISTORY:  Glaucoma  Cataract  Tobacco use disorder  HLD (hyperlipidemia)  Anxiety  Depression  Dementia  H/O endarterectomy      Labs                          10.5   5.47  )-----------( 137      ( 26 Jun 2019 06:31 )             31.4     06-26    143  |  108  |  17  ----------------------------<  88  3.1<L>   |  26  |  1.29    Ca    8.3<L>      26 Jun 2019 06:31  Mg     1.7     06-26    TPro  5.7<L>  /  Alb  3.0<L>  /  TBili  0.5  /  DBili  x   /  AST  13  /  ALT  8<L>  /  AlkPhos  100  06-26      Radiology:    Physical Exam    MENTAL STATUS  -Level of Consciousness- awake    Orientation- person  Language- aphasia/ dysarthria  Memory- recent and remote- poor      Cranial Nerve 1- 12  Pupils- equal and reactive  Eye movements- full  Facial - no asymmetry       Gait and Station- n/a    MOTOR  Upper- no drift  Lower- no foot drop    Reflexes- decreased    Sensation- no sensory level    Cerebellar- no tremors    vascular - no bruits    Assessment- Dementia    Plan  No further neuro w/o   Consider psych evaluation

## 2019-06-26 NOTE — CONSULT NOTE ADULT - ASSESSMENT
Patient is a 78yoF current smoker with pmhx dementia, depression, anxiety, suicided attempt in 2016, HLD, cataracts and glaucoma who presents from home, BIBEMS to [facility] ED for weakness and nausea. Pt admitted for management of UTI.    Problem/Plan - 1:  ·  Problem: Acute cystitis without hematuria.  Plan: Patient presented with confusion and nausea. Pt denies dysuria or increased urinary frequency. Found to have UTI on UA. S/p 1L NS and 1g CTX in the ED.  - c/w 1g CTX [id number]  - f/u urine cx.    Problem/Plan - 2:  ·  Problem: Confusion.  Plan: Patient presented with confusion in the setting of known dementia. Pt has returned to baseline mental status, per pt's nephew at bedside. CTH negative for acute hemorrhage or infarct.  - continue to monitor mental status.     Problem/Plan - 3:  ·  Problem: Failure to thrive in adult.  Plan: Pt's nephew reports poor PO intake and weight loss in the patient. Nephew reports that she eats very little. Pt has dry mucous membranes and is thin on exam.  - Nutrition consult  - start NS @ 75cc/hr.     Problem/Plan - 4:  ·  Problem: Depression.  Plan: Patient has history of depression. Nephew reports that pt had suicide attempt by overdose in 2016 for which she was hospitalized for 2 weeks. Patient currently takes Przac 50mg at home. No SI/HI.  - c/w home Prozac 50mg.     Problem/Plan - 5:  ·  Problem: Dementia.  Plan: Patient has known history of dementia that is worsening per pt's nephew. Pt is A&Ox2 at baseline. Lives at home and has HHA 5 hours a day for 7 days.  - SW consult.     Problem/Plan - 6:  Problem: Glaucoma. Plan: c/w home Latanoprost ophthalmic solution.    Problem/Plan - 7:  ·  Problem: History of endarterectomy.  Plan: Nephew reports remote history of endarterectomy. Pt takes ASA 81mg and Atorvastatin 20mg at home.  - c/w home meds.     Problem/Plan - 8:  ·  Problem: Nutrition, metabolism, and development symptoms.  Plan: F: start NS @ 75cc/hr  E: replete as needed  N: soft regular diet.     Problem/Plan - 9:  ·  Problem: Prophylactic measure.  Plan: DVT: none (improve score 1)  GI: none    Dispo: RMF  DNR.

## 2019-06-26 NOTE — PHYSICAL THERAPY INITIAL EVALUATION ADULT - PERTINENT HX OF CURRENT PROBLEM, REHAB EVAL
Patient is a 78yoF current smoker with pmhx dementia, depression, anxiety, suicided attempt in 2016, HLD, cataracts and glaucoma who presents from home, BIBEMS to [facility] ED for weakness and nausea.

## 2019-06-26 NOTE — PHYSICAL THERAPY INITIAL EVALUATION ADULT - ADDITIONAL COMMENTS
Per chart review and consulting with case management, patient has home health aide 5 hours/day for 7 days/week, walked independently without any AD. PT was unable to obtain accurate PLOF and history from pt 2/2 her being agitated.

## 2019-06-26 NOTE — DIETITIAN INITIAL EVALUATION ADULT. - ENERGY NEEDS
ABW used for calculations as pt between % of IBW.   ABW 49.9kg, IBW 50kg, 99% IBW, ht 62" (estimated, pt unable to state), BMI 20  Nutrient needs based on [facility] standards of care for maintenance in adults, adjusted for suspected malnutrition

## 2019-06-26 NOTE — PROGRESS NOTE ADULT - SUBJECTIVE AND OBJECTIVE BOX
OVERNIGHT EVENTS: CHALO    SUBJECTIVE: Pt seen and examined at bedside. She was unable to sleep well as people were coming in and out of the room all night, she also is upset she did not get her eye drops last night. Otherwise denies fever, chills, headache, chest pain, shortness of breath, abdominal pain, n/v/d/c, dysuria, numbness, weakness, tingling.     Vital Signs Last 12 Hrs  T(F): 98.3 (19 @ 08:57), Max: 98.3 (19 @ 08:57)  HR: 62 (19 @ 08:57) (62 - 64)  BP: 132/75 (19 @ 08:57) (130/77 - 132/75)  BP(mean): --  RR: 18 (19 @ 08:57) (17 - 18)  SpO2: 95% (19 @ 08:57) (95% - 97%)  I&O's Summary      PHYSICAL EXAM:  Constitutional:  Thin, elderly  female, NAD, comfortable in bed.  HEENT: NC/AT, PERRLA, EOMI, no conjunctival pallor or scleral icterus, slightly dry MM  Neck: Supple, no JVD  Respiratory: Poor effort. CTAB. No w/r/r.   Cardiovascular: RRR, normal S1 and S2, no m/r/g.   Gastrointestinal: +BS, soft NTND, no guarding or rebound tenderness, no palpable masses   Extremities: wwp; no cyanosis, clubbing or edema.   Vascular: Pulses equal and strong throughout.   Neurological: AAOx2 (not date, but knows Jamison is in office), no CN deficits, strength and sensation intact throughout.   Skin: No gross skin abnormalities or rashes        LABS:                        10.5   5.47  )-----------( 137      ( 2019 06:31 )             31.4         143  |  108  |  17  ----------------------------<  88  3.1<L>   |  26  |  1.29    Ca    8.3<L>      2019 06:31  Mg     1.7         TPro  5.7<L>  /  Alb  3.0<L>  /  TBili  0.5  /  DBili  x   /  AST  13  /  ALT  8<L>  /  AlkPhos  100  06-26    PT/INR - ( 2019 07:34 )   PT: 11.1 sec;   INR: 0.98          PTT - ( 2019 07:34 )  PTT:26.0 sec  Urinalysis Basic - ( 2019 07:48 )    Color: Yellow / Appearance: Clear / S.010 / pH: x  Gluc: x / Ketone: NEGATIVE  / Bili: Negative / Urobili: 0.2 E.U./dL   Blood: x / Protein: Trace mg/dL / Nitrite: NEGATIVE   Leuk Esterase: Large / RBC: < 5 /HPF / WBC Many /HPF   Sq Epi: x / Non Sq Epi: 0-5 /HPF / Bacteria: Present /HPF        RADIOLOGY & ADDITIONAL TESTS:    MEDICATIONS  (STANDING):  aspirin enteric coated 81 milliGRAM(s) Oral daily  atorvastatin 20 milliGRAM(s) Oral at bedtime  cefTRIAXone   IVPB 1000 milliGRAM(s) IV Intermittent every 24 hours  FLUoxetine 50 milliGRAM(s) Oral daily  latanoprost 0.005% Ophthalmic Solution 1 Drop(s) Both EYES at bedtime  magnesium sulfate  IVPB 4 Gram(s) IV Intermittent once  potassium chloride  10 mEq/100 mL IVPB 10 milliEquivalent(s) IV Intermittent every 1 hour  sodium chloride 0.9%. 1000 milliLiter(s) (75 mL/Hr) IV Continuous <Continuous>    MEDICATIONS  (PRN):

## 2019-06-27 LAB
-  AMPICILLIN: SIGNIFICANT CHANGE UP
-  CIPROFLOXACIN: SIGNIFICANT CHANGE UP
-  LEVOFLOXACIN: SIGNIFICANT CHANGE UP
-  NITROFURANTOIN: SIGNIFICANT CHANGE UP
-  TETRACYCLINE: SIGNIFICANT CHANGE UP
-  VANCOMYCIN: SIGNIFICANT CHANGE UP
ANION GAP SERPL CALC-SCNC: 10 MMOL/L — SIGNIFICANT CHANGE UP (ref 5–17)
BUN SERPL-MCNC: 18 MG/DL — SIGNIFICANT CHANGE UP (ref 7–23)
CALCIUM SERPL-MCNC: 8.3 MG/DL — LOW (ref 8.4–10.5)
CHLORIDE SERPL-SCNC: 105 MMOL/L — SIGNIFICANT CHANGE UP (ref 96–108)
CO2 SERPL-SCNC: 26 MMOL/L — SIGNIFICANT CHANGE UP (ref 22–31)
CREAT SERPL-MCNC: 1.35 MG/DL — HIGH (ref 0.5–1.3)
CULTURE RESULTS: SIGNIFICANT CHANGE UP
GLUCOSE SERPL-MCNC: 97 MG/DL — SIGNIFICANT CHANGE UP (ref 70–99)
HCT VFR BLD CALC: 33.4 % — LOW (ref 34.5–45)
HGB BLD-MCNC: 11.1 G/DL — LOW (ref 11.5–15.5)
MAGNESIUM SERPL-MCNC: 2.4 MG/DL — SIGNIFICANT CHANGE UP (ref 1.6–2.6)
MCHC RBC-ENTMCNC: 31.4 PG — SIGNIFICANT CHANGE UP (ref 27–34)
MCHC RBC-ENTMCNC: 33.2 GM/DL — SIGNIFICANT CHANGE UP (ref 32–36)
MCV RBC AUTO: 94.4 FL — SIGNIFICANT CHANGE UP (ref 80–100)
METHOD TYPE: SIGNIFICANT CHANGE UP
NRBC # BLD: 0 /100 WBCS — SIGNIFICANT CHANGE UP (ref 0–0)
ORGANISM # SPEC MICROSCOPIC CNT: SIGNIFICANT CHANGE UP
ORGANISM # SPEC MICROSCOPIC CNT: SIGNIFICANT CHANGE UP
PHOSPHATE SERPL-MCNC: 2.9 MG/DL — SIGNIFICANT CHANGE UP (ref 2.5–4.5)
PLATELET # BLD AUTO: 135 K/UL — LOW (ref 150–400)
POTASSIUM SERPL-MCNC: 4 MMOL/L — SIGNIFICANT CHANGE UP (ref 3.5–5.3)
POTASSIUM SERPL-SCNC: 4 MMOL/L — SIGNIFICANT CHANGE UP (ref 3.5–5.3)
RBC # BLD: 3.54 M/UL — LOW (ref 3.8–5.2)
RBC # FLD: 14.7 % — HIGH (ref 10.3–14.5)
SODIUM SERPL-SCNC: 141 MMOL/L — SIGNIFICANT CHANGE UP (ref 135–145)
SPECIMEN SOURCE: SIGNIFICANT CHANGE UP
WBC # BLD: 6.43 K/UL — SIGNIFICANT CHANGE UP (ref 3.8–10.5)
WBC # FLD AUTO: 6.43 K/UL — SIGNIFICANT CHANGE UP (ref 3.8–10.5)

## 2019-06-27 RX ORDER — ACETAMINOPHEN 500 MG
650 TABLET ORAL ONCE
Refills: 0 | Status: COMPLETED | OUTPATIENT
Start: 2019-06-27 | End: 2019-06-27

## 2019-06-27 RX ADMIN — ATORVASTATIN CALCIUM 20 MILLIGRAM(S): 80 TABLET, FILM COATED ORAL at 21:18

## 2019-06-27 RX ADMIN — Medication 81 MILLIGRAM(S): at 13:26

## 2019-06-27 RX ADMIN — Medication 50 MILLIGRAM(S): at 13:26

## 2019-06-27 RX ADMIN — Medication 650 MILLIGRAM(S): at 10:00

## 2019-06-27 RX ADMIN — CEFTRIAXONE 100 MILLIGRAM(S): 500 INJECTION, POWDER, FOR SOLUTION INTRAMUSCULAR; INTRAVENOUS at 10:41

## 2019-06-27 RX ADMIN — Medication 650 MILLIGRAM(S): at 09:00

## 2019-06-27 NOTE — PROGRESS NOTE ADULT - SUBJECTIVE AND OBJECTIVE BOX
Physical Medicine and Rehabilitation Progress Note:    Patient is a 78y old  Female who presents with a chief complaint of UTI (27 Jun 2019 08:57)      HPI:  Patient is a 78yoF current smoker with pmhx dementia, depression, anxiety, suicided attempt in 2016, HLD, cataracts and glaucoma who presents from home, BIBEMS to [facility] ED for weakness and nausea. Patient's nephew (Willian Hamilton, HCP) is at bedside giving patient's history. Pt states that she was feeling awful and having nausea at home. Nephew reports that when HHA was home, pt had episode of diarrhea then had a few more episodes later in the day after HHA left. Nephew states that the pt called him and told him about her symptoms and he told her to call EMS to bring her to the ED. Nephew reports that the pt has been declining in the past few years and especially since she was hospitalized at [facility] in August for constipation, malnutrition and dehydration. Nephew also reports that pt had been in a nursing home then went home with a 5 hour a day HHA. Nephew does not feel that the pt has enough attention at home and is concerned that she needs more help. Nephew also reports that she has a poor appetite and limited PO intake, eating mostly cheese sandwiches at home.    In the ED, vitals were T 98, HR 63, /87, RR 18, spO2 95% on RA. Labs were remarkable for Eosinophils 6.3%, Cr 1.37, Alk Phos 127, UA with trace protein, large LE, many WBCs and bacteria. CTH was negative for acute hemorrhage or infarct. Pt was given 1g ceftriaxone, 1mg Ativan for agitation 4mg Zofran and 1L NS. (25 Jun 2019 11:38)                            11.1   6.43  )-----------( 135      ( 27 Jun 2019 05:51 )             33.4       06-27    141  |  105  |  18  ----------------------------<  97  4.0   |  26  |  1.35<H>    Ca    8.3<L>      27 Jun 2019 05:51  Phos  2.9     06-27  Mg     2.4     06-27    TPro  5.7<L>  /  Alb  3.0<L>  /  TBili  0.5  /  DBili  x   /  AST  13  /  ALT  8<L>  /  AlkPhos  100  06-26    Vital Signs Last 24 Hrs  T(C): 36.8 (27 Jun 2019 15:52), Max: 37.1 (26 Jun 2019 22:01)  T(F): 98.3 (27 Jun 2019 15:52), Max: 98.7 (26 Jun 2019 22:01)  HR: 77 (27 Jun 2019 15:52) (71 - 97)  BP: 147/76 (27 Jun 2019 15:52) (147/76 - 166/81)  BP(mean): --  RR: 18 (27 Jun 2019 10:03) (18 - 18)  SpO2: 97% (27 Jun 2019 10:03) (95% - 99%)    MEDICATIONS  (STANDING):  aspirin enteric coated 81 milliGRAM(s) Oral daily  atorvastatin 20 milliGRAM(s) Oral at bedtime  FLUoxetine 50 milliGRAM(s) Oral daily  latanoprost 0.005% Ophthalmic Solution 1 Drop(s) Both EYES at bedtime    MEDICATIONS  (PRN):    Currently Undergoing Physical Therapy at bedside.    Functional Status Assessment:    Previous Level of Function:     · Additional Comments	Per chart review and consulting with case management, patient has home health aide 5 hours/day for 7 days/week, walked independently without any AD. PT was unable to obtain accurate PLOF and history from pt 2/2 her being agitated.	    Cognitive Status Examination:   · Level of Consciousness	alert; agitated	  · Follows Commands and Answers Questions	50% of the time	  · Personal Safety and Judgment	impaired; at risk behaviors demonstrated	    Range of Motion Exam:   · Active Range of Motion Examination	bilateral  lower extremity Active ROM was WFL (within functional limits); bilateral upper extremity Active ROM was WFL (within functional limits)	    Manual Muscle Testing:   · Manual Muscle Testing Results	LE MMT grossly >3/5 secondary to STS and gait	    Bed Mobility: Rolling/Turning:     · Level of [place]	stand-by assist	    Bed Mobility: Scooting/Bridging:     · Level of [place]	stand-by assist	  · Physical Assist/Nonphysical Assist	1 person assist	    Bed Mobility: Sit to Supine:     · Level of [place]	stand-by assist	  · Physical Assist/Nonphysical Assist	1 person assist	    Bed Mobility: Supine to Sit:     · Level of [place]	stand-by assist	  · Physical Assist/Nonphysical Assist	1 person assist	    Transfer: Sit to Stand:     · Level of [place]	contact guard	  · Physical Assist/Nonphysical Assist	1 person assist; verbal cues	  · Assistive Device	no AD	    Transfer: Stand to Sit:     · Level of [place]	contact guard	  · Physical Assist/Nonphysical Assist	1 person assist; verbal cues	  · Assistive Device	no AD	    Sit/Stand Transfer Safety Analysis:     · Transfer Safety Concerns Noted	decreased balance during turns; decreased safety awareness; losing balance; decreased weight-shifting ability	  · Impairments Contributing to Impaired Transfers	impaired balance; decreased safety awareness, agitated with clinicians	    Gait Skills:     · Level of [place]	supervision; contact guard	  · Physical Assist/Nonphysical Assist	1 person assist	  · Assistive Device	no AD	  · Gait Distance	15 feet	    Gait Analysis:     · Gait Pattern Used	2-point gait	  · Gait Deviations Noted	decreased shahab; decreased weight-shifting ability; decreased step length; dec toe clearance, shuffling gait	  · Impairments Contributing to Gait Deviations	impaired balance; cognition; impaired postural control	    Stair Negotiation:     · Level of [place]	unable to perform; Pt wanted to end session	    Balance Skills Assessment:     · Sitting Balance: Static	fair plus	  · Sitting Balance: Dynamic	fair plus	  · Sit-to-Stand Balance	Fair - / Fair	  · Standing Balance: Static	Fair - / Fair	  · Standing Balance: Dynamic	Fair - / Fair	  · Systems Impairment Contributing to Balance Disturbance	cognitive; musculoskeletal	  · Identified Impairments Contributing to Balance Disturbance	impaired postural control; Decreased safety awareness, confusion	    Clinical Impressions:   · Criteria for Skilled Therapeutic Interventions	impairments found; functional limitations in following categories; risk reduction/prevention	  · Impairments Found (describe specific impairments)	gait, locomotion, and balance	  · Functional Limitations in Following Categories (describe specific limitations)	home management; community/leisure	  · Risk Reduction/Prevention (Describe Specific Areas of risk reduction/prevention)	risk factors	  · Risk Areas	fall	  · Therapy Frequency	2-3x/week	        PM&R Impression: as above    Disposition Plan Recommendations: d/c home, home p.t., home care services

## 2019-06-27 NOTE — PROGRESS NOTE ADULT - SUBJECTIVE AND OBJECTIVE BOX
Neurology Follow up note    Name  YOAV LEARY    HPI:  Patient is a 78yoF current smoker with pmhx dementia, depression, anxiety, suicided attempt in 2016, HLD, cataracts and glaucoma who presents from home, BIBEMS to [facility] ED for weakness and nausea. Patient's nephew (Willian Leary, HCP) is at bedside giving patient's history. Pt states that she was feeling awful and having nausea at home. Nephew reports that when HHA was home, pt had episode of diarrhea then had a few more episodes later in the day after HHA left. Nephew states that the pt called him and told him about her symptoms and he told her to call EMS to bring her to the ED. Nephew reports that the pt has been declining in the past few years and especially since she was hospitalized at [facility] in August for constipation, malnutrition and dehydration. Nephew also reports that pt had been in a nursing home then went home with a 5 hour a day HHA. Nephew does not feel that the pt has enough attention at home and is concerned that she needs more help. Nephew also reports that she has a poor appetite and limited PO intake, eating mostly cheese sandwiches at home.    In the ED, vitals were T 98, HR 63, /87, RR 18, spO2 95% on RA. Labs were remarkable for Eosinophils 6.3%, Cr 1.37, Alk Phos 127, UA with trace protein, large LE, many WBCs and bacteria. CTH was negative for acute hemorrhage or infarct. Pt was given 1g ceftriaxone, 1mg Ativan for agitation 4mg Zofran and 1L NS. (25 Jun 2019 11:38)      Interval History - continues to be confused - disoriented        REVIEW OF SYSTEMS    Vital Signs Last 24 Hrs  T(C): 36.8 (27 Jun 2019 05:30), Max: 37.1 (26 Jun 2019 22:01)  T(F): 98.3 (27 Jun 2019 05:30), Max: 98.7 (26 Jun 2019 22:01)  HR: 73 (27 Jun 2019 05:30) (62 - 97)  BP: 166/81 (27 Jun 2019 05:30) (132/75 - 166/81)  BP(mean): --  RR: 18 (27 Jun 2019 05:30) (18 - 18)  SpO2: 95% (27 Jun 2019 05:30) (95% - 99%)    Physical Exam-     Mental Status- awake and disoriented    Cranial Nerves- full EOM    Gait and station- n/a    Motor- moves all 4 extremities    Reflexes- decreased    Sensation- no sensory level    Coordination- no tremors    Vascular - no bruits    Medications  aspirin enteric coated 81 milliGRAM(s) Oral daily  atorvastatin 20 milliGRAM(s) Oral at bedtime  cefTRIAXone   IVPB 1000 milliGRAM(s) IV Intermittent every 24 hours  FLUoxetine 50 milliGRAM(s) Oral daily  latanoprost 0.005% Ophthalmic Solution 1 Drop(s) Both EYES at bedtime  sodium chloride 0.9%. 1000 milliLiter(s) IV Continuous <Continuous>      Lab      Radiology    Assessment- Dementia -  Psych consult    Plan

## 2019-06-27 NOTE — PROGRESS NOTE ADULT - PROBLEM SELECTOR PLAN 1
Patient presented with confusion and nausea. Pt denies dysuria or increased urinary frequency. Found to have UTI on UA. S/p 1L NS and 1g CTX in the ED.  - c/w 1g CTX q24h  - f/u urine cx Patient presented with confusion and nausea. Pt denies dysuria or increased urinary frequency. Found to have UTI on UA. S/p 1L NS and 1g CTX in the ED.  - c/w 1g CTX q24h (6/25 - )  - f/u urine cx Patient presented with confusion and nausea. Pt denies dysuria or increased urinary frequency. Found to have UTI on UA. S/p 1L NS and 1g CTX in the ED.  - s/p 3 day course 1g CTX q24h (6/25 - 6/27)  - Urine culture with E. Coli

## 2019-06-27 NOTE — PROGRESS NOTE ADULT - SUBJECTIVE AND OBJECTIVE BOX
OVERNIGHT EVENTS: CHALO    SUBJECTIVE: Pt seen and examined at bedside. Did not sleep well as the mattress is uncomfortable, also upset that she has supervision and is unable to leave her bed without assistance. Denies fever, chills, headache, chest pain, shortness of breath, abdominal pain, n/v/d/c, numbness, weakness, tingling.     Vital Signs Last 12 Hrs  T(F): 98.3 (06-27-19 @ 05:30), Max: 98.7 (06-26-19 @ 22:01)  HR: 73 (06-27-19 @ 05:30) (73 - 78)  BP: 166/81 (06-27-19 @ 05:30) (161/92 - 166/81)  BP(mean): --  RR: 18 (06-27-19 @ 05:30) (18 - 18)  SpO2: 95% (06-27-19 @ 05:30) (95% - 97%)  I&O's Summary      PHYSICAL EXAM:  Constitutional:  Thin, elderly  female, NAD, comfortable in bed.  HEENT: NC/AT, PERRLA, EOMI, no conjunctival pallor or scleral icterus, slightly dry MM  Neck: Supple, no JVD  Respiratory: Poor effort. CTAB. No w/r/r.   Cardiovascular: RRR, normal S1 and S2, no m/r/g.   Gastrointestinal: +BS, soft NTND, no guarding or rebound tenderness, no palpable masses   Extremities: wwp; no cyanosis, clubbing or edema.   Vascular: Pulses equal and strong throughout.   Neurological: AAOx2 (not date, but knows Jamison is in office), no CN deficits, strength and sensation intact throughout.   Skin: No gross skin abnormalities or rashes        LABS:                        11.1   6.43  )-----------( 135      ( 27 Jun 2019 05:51 )             33.4     06-27    141  |  105  |  18  ----------------------------<  97  4.0   |  26  |  1.35<H>    Ca    8.3<L>      27 Jun 2019 05:51  Phos  2.9     06-27  Mg     2.4     06-27    TPro  5.7<L>  /  Alb  3.0<L>  /  TBili  0.5  /  DBili  x   /  AST  13  /  ALT  8<L>  /  AlkPhos  100  06-26          RADIOLOGY & ADDITIONAL TESTS:    MEDICATIONS  (STANDING):  acetaminophen   Tablet .. 650 milliGRAM(s) Oral once  aspirin enteric coated 81 milliGRAM(s) Oral daily  atorvastatin 20 milliGRAM(s) Oral at bedtime  cefTRIAXone   IVPB 1000 milliGRAM(s) IV Intermittent every 24 hours  FLUoxetine 50 milliGRAM(s) Oral daily  latanoprost 0.005% Ophthalmic Solution 1 Drop(s) Both EYES at bedtime  sodium chloride 0.9%. 1000 milliLiter(s) (75 mL/Hr) IV Continuous <Continuous>    MEDICATIONS  (PRN): Hospital Course: Patient is a 78yoF current smoker with pmhx dementia, depression, anxiety, suicided attempt in 2016, HLD, cataracts and glaucoma who presents from home, BIBEMS to [facility] ED for weakness, nausea and vomiting. She was recently hospitaized at [facility] for constipation, malnutrition, defydration, and went home with a home health aid for 5 hours a day. Pt's nephew who was with her on admission feels she needs more help. In ED pt was noted to have a positive UA. CT head was negative for acute hemorrhage or infarct. Pt was started on ceftriaxone, given ativan for agitation. On 6/27 she will have her third dose of ceftriaxone. Her urine cultures grew E. Coli, sensitivities are pending. Nutrition was consulted for suspected failure to thrive. She was continued on her home prozac for depression. She is AOx2 (not date) at baseline and displays intermittent agitation but is redirectable when reoriented and spoken to calmly.    OVERNIGHT EVENTS: CHALO    SUBJECTIVE: Pt seen and examined at bedside. Did not sleep well as the mattress is uncomfortable, also upset that she has supervision and is unable to leave her bed without assistance. Denies fever, chills, headache, chest pain, shortness of breath, abdominal pain, n/v/d/c, numbness, weakness, tingling.     Vital Signs Last 12 Hrs  T(F): 98.3 (06-27-19 @ 05:30), Max: 98.7 (06-26-19 @ 22:01)  HR: 73 (06-27-19 @ 05:30) (73 - 78)  BP: 166/81 (06-27-19 @ 05:30) (161/92 - 166/81)  BP(mean): --  RR: 18 (06-27-19 @ 05:30) (18 - 18)  SpO2: 95% (06-27-19 @ 05:30) (95% - 97%)  I&O's Summary      PHYSICAL EXAM:  Constitutional:  Thin, elderly  female, NAD, comfortable in bed.  HEENT: NC/AT, PERRLA, EOMI, no conjunctival pallor or scleral icterus, slightly dry MM  Neck: Supple, no JVD  Respiratory: Poor effort. CTAB. No w/r/r.   Cardiovascular: RRR, normal S1 and S2, no m/r/g.   Gastrointestinal: +BS, soft NTND, no guarding or rebound tenderness, no palpable masses   Extremities: wwp; no cyanosis, clubbing or edema.   Vascular: Pulses equal and strong throughout.   Neurological: AAOx2 (not date, but knows Jamison is in office), no CN deficits, strength and sensation intact throughout.   Skin: No gross skin abnormalities or rashes        LABS:                        11.1   6.43  )-----------( 135      ( 27 Jun 2019 05:51 )             33.4     06-27    141  |  105  |  18  ----------------------------<  97  4.0   |  26  |  1.35<H>    Ca    8.3<L>      27 Jun 2019 05:51  Phos  2.9     06-27  Mg     2.4     06-27    TPro  5.7<L>  /  Alb  3.0<L>  /  TBili  0.5  /  DBili  x   /  AST  13  /  ALT  8<L>  /  AlkPhos  100  06-26          RADIOLOGY & ADDITIONAL TESTS:    MEDICATIONS  (STANDING):  acetaminophen   Tablet .. 650 milliGRAM(s) Oral once  aspirin enteric coated 81 milliGRAM(s) Oral daily  atorvastatin 20 milliGRAM(s) Oral at bedtime  cefTRIAXone   IVPB 1000 milliGRAM(s) IV Intermittent every 24 hours  FLUoxetine 50 milliGRAM(s) Oral daily  latanoprost 0.005% Ophthalmic Solution 1 Drop(s) Both EYES at bedtime  sodium chloride 0.9%. 1000 milliLiter(s) (75 mL/Hr) IV Continuous <Continuous>    MEDICATIONS  (PRN): Hospital Course: Patient is a 78yoF current smoker with pmhx dementia, depression, anxiety, suicided attempt in 2016, HLD, cataracts and glaucoma who presents from home, BIBEMS to [facility] ED for weakness, nausea and vomiting. She was recently hospitaized at [facility] for constipation, malnutrition, defydration, and went home with a home health aid for 5 hours a day. Pt's nephew who was with her on admission feels she needs more help. In ED pt was noted to have a positive UA. CT head was negative for acute hemorrhage or infarct. Pt was started on ceftriaxone, given ativan for agitation. On 6/27 she will receive her third dose of ceftriaxone. Her urine cultures grew E. Coli, sensitivities are pending. Nutrition was consulted for suspected failure to thrive. She was continued on her home prozac for depression. She is AOx2 (not date) at baseline and displays intermittent agitation but is redirectable when reoriented and spoken to calmly.    OVERNIGHT EVENTS: CHALO    SUBJECTIVE: Pt seen and examined at bedside. Did not sleep well as the mattress is uncomfortable, also upset that she has supervision and is unable to leave her bed without assistance. Denies fever, chills, headache, chest pain, shortness of breath, abdominal pain, n/v/d/c, numbness, weakness, tingling.     Vital Signs Last 12 Hrs  T(F): 98.3 (06-27-19 @ 05:30), Max: 98.7 (06-26-19 @ 22:01)  HR: 73 (06-27-19 @ 05:30) (73 - 78)  BP: 166/81 (06-27-19 @ 05:30) (161/92 - 166/81)  BP(mean): --  RR: 18 (06-27-19 @ 05:30) (18 - 18)  SpO2: 95% (06-27-19 @ 05:30) (95% - 97%)  I&O's Summary      PHYSICAL EXAM:  Constitutional:  Thin, elderly  female, NAD, comfortable in bed.  HEENT: NC/AT, PERRLA, EOMI, no conjunctival pallor or scleral icterus, slightly dry MM  Neck: Supple, no JVD  Respiratory: Poor effort. CTAB. No w/r/r.   Cardiovascular: RRR, normal S1 and S2, no m/r/g.   Gastrointestinal: +BS, soft NTND, no guarding or rebound tenderness, no palpable masses   Extremities: wwp; no cyanosis, clubbing or edema.   Vascular: Pulses equal and strong throughout.   Neurological: AAOx2 (not date, but knows Jamison is in office), no CN deficits, strength and sensation intact throughout.   Skin: No gross skin abnormalities or rashes        LABS:                        11.1   6.43  )-----------( 135      ( 27 Jun 2019 05:51 )             33.4     06-27    141  |  105  |  18  ----------------------------<  97  4.0   |  26  |  1.35<H>    Ca    8.3<L>      27 Jun 2019 05:51  Phos  2.9     06-27  Mg     2.4     06-27    TPro  5.7<L>  /  Alb  3.0<L>  /  TBili  0.5  /  DBili  x   /  AST  13  /  ALT  8<L>  /  AlkPhos  100  06-26          RADIOLOGY & ADDITIONAL TESTS:    MEDICATIONS  (STANDING):  acetaminophen   Tablet .. 650 milliGRAM(s) Oral once  aspirin enteric coated 81 milliGRAM(s) Oral daily  atorvastatin 20 milliGRAM(s) Oral at bedtime  cefTRIAXone   IVPB 1000 milliGRAM(s) IV Intermittent every 24 hours  FLUoxetine 50 milliGRAM(s) Oral daily  latanoprost 0.005% Ophthalmic Solution 1 Drop(s) Both EYES at bedtime  sodium chloride 0.9%. 1000 milliLiter(s) (75 mL/Hr) IV Continuous <Continuous>    MEDICATIONS  (PRN):

## 2019-06-27 NOTE — PROGRESS NOTE ADULT - SUBJECTIVE AND OBJECTIVE BOX
77 y/o female cigs htn endarterectomy cva oms agitation uti resolving with antibiotics seen by neuro/rehab clinically stable lungs clear heart s1s2 2/6 shanel lpsb and apex bp 130/80 discussed with house staff  social service to plan for transfer to snf

## 2019-06-27 NOTE — PROGRESS NOTE ADULT - PROBLEM SELECTOR PLAN 8
F: NS @ 75cc/hr  E: replete as needed  N: soft regular diet F: Not indicated  E: replete as needed  N: soft regular diet    code status: DNR

## 2019-06-28 LAB
ANION GAP SERPL CALC-SCNC: 9 MMOL/L — SIGNIFICANT CHANGE UP (ref 5–17)
BUN SERPL-MCNC: 17 MG/DL — SIGNIFICANT CHANGE UP (ref 7–23)
CALCIUM SERPL-MCNC: 8.6 MG/DL — SIGNIFICANT CHANGE UP (ref 8.4–10.5)
CHLORIDE SERPL-SCNC: 107 MMOL/L — SIGNIFICANT CHANGE UP (ref 96–108)
CO2 SERPL-SCNC: 26 MMOL/L — SIGNIFICANT CHANGE UP (ref 22–31)
CREAT SERPL-MCNC: 1.39 MG/DL — HIGH (ref 0.5–1.3)
GLUCOSE SERPL-MCNC: 92 MG/DL — SIGNIFICANT CHANGE UP (ref 70–99)
HCT VFR BLD CALC: 36 % — SIGNIFICANT CHANGE UP (ref 34.5–45)
HGB BLD-MCNC: 11.6 G/DL — SIGNIFICANT CHANGE UP (ref 11.5–15.5)
MAGNESIUM SERPL-MCNC: 1.9 MG/DL — SIGNIFICANT CHANGE UP (ref 1.6–2.6)
MCHC RBC-ENTMCNC: 31.4 PG — SIGNIFICANT CHANGE UP (ref 27–34)
MCHC RBC-ENTMCNC: 32.2 GM/DL — SIGNIFICANT CHANGE UP (ref 32–36)
MCV RBC AUTO: 97.3 FL — SIGNIFICANT CHANGE UP (ref 80–100)
NRBC # BLD: 0 /100 WBCS — SIGNIFICANT CHANGE UP (ref 0–0)
PHOSPHATE SERPL-MCNC: 3.1 MG/DL — SIGNIFICANT CHANGE UP (ref 2.5–4.5)
PLATELET # BLD AUTO: 138 K/UL — LOW (ref 150–400)
POTASSIUM SERPL-MCNC: 4 MMOL/L — SIGNIFICANT CHANGE UP (ref 3.5–5.3)
POTASSIUM SERPL-SCNC: 4 MMOL/L — SIGNIFICANT CHANGE UP (ref 3.5–5.3)
RBC # BLD: 3.7 M/UL — LOW (ref 3.8–5.2)
RBC # FLD: 14.9 % — HIGH (ref 10.3–14.5)
SODIUM SERPL-SCNC: 142 MMOL/L — SIGNIFICANT CHANGE UP (ref 135–145)
WBC # BLD: 6.08 K/UL — SIGNIFICANT CHANGE UP (ref 3.8–10.5)
WBC # FLD AUTO: 6.08 K/UL — SIGNIFICANT CHANGE UP (ref 3.8–10.5)

## 2019-06-28 RX ADMIN — Medication 81 MILLIGRAM(S): at 14:30

## 2019-06-28 RX ADMIN — LATANOPROST 1 DROP(S): 0.05 SOLUTION/ DROPS OPHTHALMIC; TOPICAL at 00:27

## 2019-06-28 RX ADMIN — LATANOPROST 1 DROP(S): 0.05 SOLUTION/ DROPS OPHTHALMIC; TOPICAL at 22:23

## 2019-06-28 RX ADMIN — ATORVASTATIN CALCIUM 20 MILLIGRAM(S): 80 TABLET, FILM COATED ORAL at 22:23

## 2019-06-28 RX ADMIN — Medication 50 MILLIGRAM(S): at 14:30

## 2019-06-28 NOTE — DISCHARGE NOTE NURSING/CASE MANAGEMENT/SOCIAL WORK - NSDCPEWEB_GEN_ALL_CORE
[facility] for Tobacco Control website --- [email or web address] website --- [email or web address]

## 2019-06-28 NOTE — DISCHARGE NOTE NURSING/CASE MANAGEMENT/SOCIAL WORK - NSDCPEEMAIL_GEN_ALL_CORE
[facility] for Tobacco Control email [email or web address]

## 2019-06-28 NOTE — DISCHARGE NOTE PROVIDER - CARE PROVIDER_API CALL
Kj Polk)  Internal Medicine  [address] [place] [id number]  Phone: [phone]  Fax: [phone]  Follow Up Time:

## 2019-06-28 NOTE — DISCHARGE NOTE PROVIDER - NSDCCPCAREPLAN_GEN_ALL_CORE_FT
PRINCIPAL DISCHARGE DIAGNOSIS  Diagnosis: Urinary tract infection  Assessment and Plan of Treatment:       SECONDARY DISCHARGE DIAGNOSES  Diagnosis: Confusion  Assessment and Plan of Treatment: PRINCIPAL DISCHARGE DIAGNOSIS  Diagnosis: Urinary tract infection  Assessment and Plan of Treatment: You had an infection in your bladder. We treated the infection with antibiotics for a few days until you got better.      SECONDARY DISCHARGE DIAGNOSES  Diagnosis: Confusion  Assessment and Plan of Treatment: You have something called dementia. This can happen as people age. Your urinary tract infection may have worsened your confusion. PRINCIPAL DISCHARGE DIAGNOSIS  Diagnosis: Urinary tract infection  Assessment and Plan of Treatment: You had an infection in your bladder. We treated the infection with antibiotics for a few days until you got better.      SECONDARY DISCHARGE DIAGNOSES  Diagnosis: History of endarterectomy  Assessment and Plan of Treatment: -You have a history of carotid artery stenosis, please continue with your aspirin and other home medications for this condition.    Diagnosis: Dementia  Assessment and Plan of Treatment: -you have dementia and depression. Continue mirtazapine and your other home medications.    Diagnosis: Confusion  Assessment and Plan of Treatment: You have something called dementia. This can happen as people age. Your urinary tract infection may have worsened your confusion.

## 2019-06-28 NOTE — PROGRESS NOTE ADULT - SUBJECTIVE AND OBJECTIVE BOX
OVERNIGHT EVENTS: CHALO    SUBJECTIVE: Pt seen and examined at bedside. Pt says she still feels confused and is ready to go home. She does not have any complaints. Denies fever, chills, chest pain, shortness of breath, abdominal pain, dysuria, hematuria. She's eating, and having bowel movements. Waiting for archcare placement.    T(F): 98.1 (06-28-19 @ 08:56)  HR: 86 (06-28-19 @ 09:47)  BP: 162/106 (06-28-19 @ 09:47)  RR: 20 (06-28-19 @ 08:56)  SpO2: 96% (06-28-19 @ 08:56)    PHYSICAL EXAM:  Constitutional:  Thin, elderly  female, NAD   Neck: Supple, no JVD  Respiratory: CTAB, no wheezing  Cardiovascular: RRR, no murmurs  Gastrointestinal: +BS, soft NTND, no guarding or rebound tenderness, no palpable masses   Extremities: wwp; no cyanosis, clubbing or edema.   Vascular: Pulses equal and strong throughout.   Neurological: AAOx2 (doesn't know exact year)  Skin: No gross skin abnormalities or rashes      LABS:                         11.6   6.08  )-----------( 138      ( 28 Jun 2019 06:56 )             36.0     06-28    142  |  107  |  17  ----------------------------<  92  4.0   |  26  |  1.39<H>    Ca    8.6      28 Jun 2019 06:56  Phos  3.1     06-28  Mg     1.9     06-28      MEDICATIONS  (STANDING):  aspirin enteric coated 81 milliGRAM(s) Oral daily  atorvastatin 20 milliGRAM(s) Oral at bedtime  FLUoxetine 50 milliGRAM(s) Oral daily  latanoprost 0.005% Ophthalmic Solution 1 Drop(s) Both EYES at bedtime    MEDICATIONS  (PRN):

## 2019-06-28 NOTE — DISCHARGE NOTE NURSING/CASE MANAGEMENT/SOCIAL WORK - NSDCDPATPORTLINK_GEN_ALL_CORE
You can access the [facility] Patient Portal, offered by [facility], by registering with the following website: [email or web address]

## 2019-06-28 NOTE — PROGRESS NOTE ADULT - SUBJECTIVE AND OBJECTIVE BOX
77 y/o female oms agitation cigs  htn uti clinically improved alert lungs clear heart s1s2 2/6 shanel lpsb and apex bp 130/80 seen by neuro/rehab d/c planning for snf discussed with all concerned

## 2019-06-28 NOTE — PROGRESS NOTE ADULT - SUBJECTIVE AND OBJECTIVE BOX
Neurology Follow up note    Name  YOAV LEARY    HPI:  Patient is a 78yoF current smoker with pmhx dementia, depression, anxiety, suicided attempt in 2016, HLD, cataracts and glaucoma who presents from home, BIBEMS to [facility] ED for weakness and nausea. Patient's nephew (Willian Leary, HCP) is at bedside giving patient's history. Pt states that she was feeling awful and having nausea at home. Nephew reports that when HHA was home, pt had episode of diarrhea then had a few more episodes later in the day after HHA left. Nephew states that the pt called him and told him about her symptoms and he told her to call EMS to bring her to the ED. Nephew reports that the pt has been declining in the past few years and especially since she was hospitalized at [facility] in August for constipation, malnutrition and dehydration. Nephew also reports that pt had been in a nursing home then went home with a 5 hour a day HHA. Nephew does not feel that the pt has enough attention at home and is concerned that she needs more help. Nephew also reports that she has a poor appetite and limited PO intake, eating mostly cheese sandwiches at home.    In the ED, vitals were T 98, HR 63, /87, RR 18, spO2 95% on RA. Labs were remarkable for Eosinophils 6.3%, Cr 1.37, Alk Phos 127, UA with trace protein, large LE, many WBCs and bacteria. CTH was negative for acute hemorrhage or infarct. Pt was given 1g ceftriaxone, 1mg Ativan for agitation 4mg Zofran and 1L NS. (25 Jun 2019 11:38)      Interval History - agitation and confusion - no reports of headache or pain        REVIEW OF SYSTEMS    Vital Signs Last 24 Hrs  T(C): 36.7 (28 Jun 2019 08:56), Max: 37.1 (27 Jun 2019 21:07)  T(F): 98.1 (28 Jun 2019 08:56), Max: 98.7 (27 Jun 2019 21:07)  HR: 86 (28 Jun 2019 09:47) (63 - 86)  BP: 162/106 (28 Jun 2019 09:47) (147/76 - 186/98)  BP(mean): --  RR: 20 (28 Jun 2019 08:56) (18 - 20)  SpO2: 96% (28 Jun 2019 08:56) (96% - 99%)    Physical Exam-     Mental Status- awake and confused    Cranial Nerves- full lateral eye movements  Gait and station- no foot drop    Motor- moves all 4 extremities    Reflexes- decreased    Sensation- no sensory level    Coordination- no tremors    Vascular - no bruits    Medications  aspirin enteric coated 81 milliGRAM(s) Oral daily  atorvastatin 20 milliGRAM(s) Oral at bedtime  FLUoxetine 50 milliGRAM(s) Oral daily  latanoprost 0.005% Ophthalmic Solution 1 Drop(s) Both EYES at bedtime      Lab      Radiology    Assessment- Dementia    Plan FU as per Dr Michael

## 2019-06-28 NOTE — DISCHARGE NOTE PROVIDER - HOSPITAL COURSE
Patient is a 79 y/o f current smoker w/ dementia, depression, anxiety, suicidal attempt 2016 who presents due to weakness/nausea. She had declining mental status functioning over the years. She follows Dr. Polk on an outpatient basis. Found to have positive u/a and treated for urinary tract infection and now patient is back to her baseline functioning. Had discussion with son and they want to have 24/7 home care to help manage her. She will follow up on an outpatient basis. Patient is a 79 y/o f current smoker w/ dementia, depression, anxiety, suicidal attempt 2016 who presents due to weakness/nausea. She had declining mental status functioning over the years. She follows Dr. Polk on an outpatient basis. Found to have positive u/a and treated for urinary tract infection and now patient is back to her baseline functioning. Had discussion with son and they want to have 24/7 home care and she will go to [facility] while waiting for 24/7 home health.

## 2019-06-28 NOTE — PROGRESS NOTE ADULT - PROBLEM SELECTOR PLAN 1
Patient presented with confusion and nausea. Pt denies dysuria or increased urinary frequency. Found to have UTI on UA. S/p 1L NS and 1g CTX in the ED.  - s/p 3 day course 1g CTX q24h (6/25 - 6/27)  - Urine culture with E. Coli

## 2019-06-29 RX ORDER — POLYETHYLENE GLYCOL 3350 17 G/17G
17 POWDER, FOR SOLUTION ORAL EVERY 12 HOURS
Refills: 0 | Status: DISCONTINUED | OUTPATIENT
Start: 2019-06-29 | End: 2019-06-29

## 2019-06-29 RX ORDER — SENNA PLUS 8.6 MG/1
2 TABLET ORAL AT BEDTIME
Refills: 0 | Status: DISCONTINUED | OUTPATIENT
Start: 2019-06-29 | End: 2019-06-29

## 2019-06-29 RX ADMIN — Medication 81 MILLIGRAM(S): at 11:51

## 2019-06-29 RX ADMIN — Medication 50 MILLIGRAM(S): at 11:50

## 2019-06-29 RX ADMIN — Medication 0.5 MILLIGRAM(S): at 14:05

## 2019-06-29 RX ADMIN — LATANOPROST 1 DROP(S): 0.05 SOLUTION/ DROPS OPHTHALMIC; TOPICAL at 21:47

## 2019-06-29 RX ADMIN — ATORVASTATIN CALCIUM 20 MILLIGRAM(S): 80 TABLET, FILM COATED ORAL at 21:46

## 2019-06-29 RX ADMIN — Medication 0.5 MILLIGRAM(S): at 15:00

## 2019-06-29 NOTE — PROGRESS NOTE ADULT - SUBJECTIVE AND OBJECTIVE BOX
OVERNIGHT EVENTS: CHALO    SUBJECTIVE: Pt seen and examined at bedside. Pt was eating breakfast. Pt says she has some abdominal pain. She tried to go to the bathroom last night but was not able to. She says this had happened to her before at [facility] and they gave her medicine to make her poop which helped. Denies fever, chills, chest pain, shortness of breath, dysuria, hematuria, nausea, or vomiting.      T(F): 97.8 (06-29-19 @ 08:34)  HR: 76 (06-29-19 @ 08:34)  BP: 165/81 (06-29-19 @ 08:34)  RR: 18 (06-29-19 @ 08:34)  SpO2: 95% (06-29-19 @ 08:34)    PHYSICAL EXAM:  Constitutional:  Thin, elderly  female, NAD   Neck: Supple, no JVD  Respiratory: CTAB, no wheezing  Cardiovascular: RRR, no murmurs  Gastrointestinal: +BS, soft, no guarding or rebound tenderness, no palpable masses, some discomfort with deep palpation of abdomen in lower quadrants  Extremities: wwp; no cyanosis, clubbing or edema.   Vascular: Pulses equal and strong throughout.   Neurological: AAOx2 (doesn't know exact year)  Skin: No gross skin abnormalities or rashes    .  LABS:                         11.6   6.08  )-----------( 138      ( 28 Jun 2019 06:56 )             36.0     06-28    142  |  107  |  17  ----------------------------<  92  4.0   |  26  |  1.39<H>    Ca    8.6      28 Jun 2019 06:56  Phos  3.1     06-28  Mg     1.9     06-28                    RADIOLOGY, EKG & ADDITIONAL TESTS: Reviewed.       MEDICATIONS  (STANDING):  aspirin enteric coated 81 milliGRAM(s) Oral daily  atorvastatin 20 milliGRAM(s) Oral at bedtime  FLUoxetine 50 milliGRAM(s) Oral daily  latanoprost 0.005% Ophthalmic Solution 1 Drop(s) Both EYES at bedtime    MEDICATIONS  (PRN):

## 2019-06-29 NOTE — PROGRESS NOTE ADULT - SUBJECTIVE AND OBJECTIVE BOX
PT IS S/P  uti   DISORIENTATION     PAST MEDICAL & SURGICAL HISTORY:  Glaucoma  Cataract  Tobacco use disorder  HLD (hyperlipidemia)  Anxiety  Depression  Dementia  H/O endarterectomy      Home Medications:  aspirin: 81 milligram(s) orally once a day (25 Jun 2019 11:51)  atorvastatin: 20 milligram(s) orally once a day (25 Jun 2019 11:52)  Chantix:  (25 Jun 2019 10:28)  latanoprost 0.005% ophthalmic solution: 1 drop(s) to each affected eye once a day (in the evening) (25 Jun 2019 11:53)  PROzac: 50 milligram(s) orally once a day (25 Jun 2019 11:51)      MEDICATIONS  (STANDING):  aspirin enteric coated 81 milliGRAM(s) Oral daily  atorvastatin 20 milliGRAM(s) Oral at bedtime  FLUoxetine 50 milliGRAM(s) Oral daily  latanoprost 0.005% Ophthalmic Solution 1 Drop(s) Both EYES at bedtime    MEDICATIONS  (PRN):      LUNGS  CLEAR      CV   S1 S2   ABD SOFT   EXT  SOFT                            11.6   6.08  )-----------( 138      ( 28 Jun 2019 06:56 )             36.0   06-28    142  |  107  |  17  ----------------------------<  92  4.0   |  26  |  1.39<H>    Ca    8.6      28 Jun 2019 06:56  Phos  3.1     06-28  Mg     1.9     06-28        CXR  cardiomegaly   interstitial  markings      CT head  no hemorhhage   no  infarct  noted

## 2019-06-29 NOTE — PROGRESS NOTE ADULT - PROBLEM SELECTOR PLAN 1
Patient presented with confusion and nausea. Pt denies dysuria or increased urinary frequency. Found to have UTI on UA. S/p 1L NS and 1g CTX in the ED.  - s/p 3 day course 1g CTX q24h (6/25 - 6/27)  - Urine culture with E. Coli  -resolved pt has no urinary symptoms  -now awaiting placement for full time HHA w/ arch[facility] placement

## 2019-06-30 LAB
CULTURE RESULTS: SIGNIFICANT CHANGE UP
CULTURE RESULTS: SIGNIFICANT CHANGE UP
SPECIMEN SOURCE: SIGNIFICANT CHANGE UP
SPECIMEN SOURCE: SIGNIFICANT CHANGE UP

## 2019-06-30 RX ADMIN — LATANOPROST 1 DROP(S): 0.05 SOLUTION/ DROPS OPHTHALMIC; TOPICAL at 21:24

## 2019-06-30 RX ADMIN — Medication 81 MILLIGRAM(S): at 12:39

## 2019-06-30 RX ADMIN — Medication 50 MILLIGRAM(S): at 12:40

## 2019-06-30 RX ADMIN — ATORVASTATIN CALCIUM 20 MILLIGRAM(S): 80 TABLET, FILM COATED ORAL at 21:24

## 2019-06-30 NOTE — PROGRESS NOTE ADULT - SUBJECTIVE AND OBJECTIVE BOX
Pt having incomplete  Bowel Movts  and accidents while attempting defacation     PAST MEDICAL & SURGICAL HISTORY:  Glaucoma  Cataract  Tobacco use disorder  HLD (hyperlipidemia)  Anxiety  Depression  Dementia  H/O endarterectomy    MEDICATIONS  (STANDING):  aspirin enteric coated 81 milliGRAM(s) Oral daily  atorvastatin 20 milliGRAM(s) Oral at bedtime  FLUoxetine 50 milliGRAM(s) Oral daily  latanoprost 0.005% Ophthalmic Solution 1 Drop(s) Both EYES at bedtime    MEDICATIONS  (PRN):  ICU Vital Signs Last 24 Hrs  T(C): 36.9 (30 Jun 2019 08:45), Max: 36.9 (30 Jun 2019 05:55)  T(F): 98.4 (30 Jun 2019 08:45), Max: 98.5 (30 Jun 2019 05:55)  HR: 72 (30 Jun 2019 08:45) (72 - 88)  BP: 142/73 (30 Jun 2019 08:45) (142/73 - 170/82)  BP(mean): --  ABP: --  ABP(mean): --  RR: 17 (30 Jun 2019 08:45) (16 - 18)  SpO2: 96% (30 Jun 2019 08:45) (95% - 98%)      Lungs clear  CV s1 s2  abd soft  Ext stable      Culture - Urine (06.25.19 @ 08:41)    -  Ampicillin: S <=2 Predicts results to ampicillin/sulbactam, amoxacillin-clavulanate and  piperacillin-tazobactam.    -  Ciprofloxacin: R >2    -  Levofloxacin: R >4    -  Nitrofurantoin: S <=32 Should not be used to treat pyelonephritis.    -  Tetra/Doxy: I 8    -  Vancomycin: S 1    Specimen Source: .Urine Clean Catch (Midstream)    Culture Results:   15,000 CFU/ml Enterococcus faecalis    Organism Identification: Enterococcus faecalis    Organism: Enterococcus faecalis    Method Type: JEANNIE  CXR neg     CT head no acute changes

## 2019-07-01 VITALS
HEIGHT: 65 IN | OXYGEN SATURATION: 96 % | HEART RATE: 82 BPM | DIASTOLIC BLOOD PRESSURE: 98 MMHG | RESPIRATION RATE: 18 BRPM | TEMPERATURE: 98 F | SYSTOLIC BLOOD PRESSURE: 168 MMHG

## 2019-07-01 PROCEDURE — 84484 ASSAY OF TROPONIN QUANT: CPT

## 2019-07-01 PROCEDURE — 82553 CREATINE MB FRACTION: CPT

## 2019-07-01 PROCEDURE — 84100 ASSAY OF PHOSPHORUS: CPT

## 2019-07-01 PROCEDURE — 97116 GAIT TRAINING THERAPY: CPT

## 2019-07-01 PROCEDURE — 36415 COLL VENOUS BLD VENIPUNCTURE: CPT

## 2019-07-01 PROCEDURE — 87040 BLOOD CULTURE FOR BACTERIA: CPT

## 2019-07-01 PROCEDURE — 85027 COMPLETE CBC AUTOMATED: CPT

## 2019-07-01 PROCEDURE — 81001 URINALYSIS AUTO W/SCOPE: CPT

## 2019-07-01 PROCEDURE — 71045 X-RAY EXAM CHEST 1 VIEW: CPT

## 2019-07-01 PROCEDURE — 85025 COMPLETE CBC W/AUTO DIFF WBC: CPT

## 2019-07-01 PROCEDURE — 93005 ELECTROCARDIOGRAM TRACING: CPT

## 2019-07-01 PROCEDURE — 97161 PT EVAL LOW COMPLEX 20 MIN: CPT

## 2019-07-01 PROCEDURE — 85730 THROMBOPLASTIN TIME PARTIAL: CPT

## 2019-07-01 PROCEDURE — 96375 TX/PRO/DX INJ NEW DRUG ADDON: CPT

## 2019-07-01 PROCEDURE — 85610 PROTHROMBIN TIME: CPT

## 2019-07-01 PROCEDURE — 87086 URINE CULTURE/COLONY COUNT: CPT

## 2019-07-01 PROCEDURE — 80048 BASIC METABOLIC PNL TOTAL CA: CPT

## 2019-07-01 PROCEDURE — 80053 COMPREHEN METABOLIC PANEL: CPT

## 2019-07-01 PROCEDURE — 96365 THER/PROPH/DIAG IV INF INIT: CPT

## 2019-07-01 PROCEDURE — 99285 EMERGENCY DEPT VISIT HI MDM: CPT | Mod: 25

## 2019-07-01 PROCEDURE — 83735 ASSAY OF MAGNESIUM: CPT

## 2019-07-01 PROCEDURE — 87631 RESP VIRUS 3-5 TARGETS: CPT

## 2019-07-01 PROCEDURE — 82550 ASSAY OF CK (CPK): CPT

## 2019-07-01 PROCEDURE — 70450 CT HEAD/BRAIN W/O DYE: CPT

## 2019-07-01 PROCEDURE — 97110 THERAPEUTIC EXERCISES: CPT

## 2019-07-01 PROCEDURE — 87186 SC STD MICRODIL/AGAR DIL: CPT

## 2019-07-01 RX ADMIN — Medication 81 MILLIGRAM(S): at 11:52

## 2019-07-01 RX ADMIN — Medication 50 MILLIGRAM(S): at 11:52

## 2019-07-01 NOTE — PROGRESS NOTE ADULT - PROBLEM SELECTOR PLAN 6
c/w home Latanoprost ophthalmic solution

## 2019-07-01 NOTE — PROGRESS NOTE ADULT - PROBLEM SELECTOR PLAN 5
Patient has known history of dementia that is worsening per pt's nephew. Pt is A&Ox2 at baseline. Lives at home and has HHA 5 hours a day for 7 days. Working on getting her a HHA for arounnd the clock
Patient has known history of dementia that is worsening per pt's nephew. Pt is A&Ox2 at baseline. Lives at home and has HHA 5 hours a day for 7 days.
Patient has known history of dementia that is worsening per pt's nephew. Pt is A&Ox2 at baseline. Lives at home and has HHA 5 hours a day for 7 days.  - SW consult
Patient has known history of dementia that is worsening per pt's nephew. Pt is A&Ox2 at baseline. Lives at home and has HHA 5 hours a day for 7 days.  - SW consult
Patient has known history of dementia that is worsening per pt's nephew. Pt is A&Ox2 at baseline. Lives at home and has HHA 5 hours a day for 7 days. Working on getting her a HHA for arounnd the clock

## 2019-07-01 NOTE — PROGRESS NOTE ADULT - PROBLEM SELECTOR PLAN 2
Patient presented with confusion in the setting of known dementia. Pt has returned to baseline mental status, per pt's nephew at bedside. CTH negative for acute hemorrhage or infarct.  -Calm today  - continue to monitor mental status
Patient presented with confusion in the setting of known dementia. Pt has returned to baseline mental status, per pt's nephew at bedside. CTH negative for acute hemorrhage or infarct.  - continue to monitor mental status

## 2019-07-01 NOTE — PROGRESS NOTE ADULT - SUBJECTIVE AND OBJECTIVE BOX
Physical Medicine and Rehabilitation Progress Note:    Patient is a 78y old  Female who presents with a chief complaint of UTI (01 Jul 2019 11:16)      HPI:  Patient is a 78yoF current smoker with pmhx dementia, depression, anxiety, suicided attempt in 2016, HLD, cataracts and glaucoma who presents from home, BIBEMS to [facility] ED for weakness and nausea. Patient's nephew (Willian Hamilton, HCP) is at bedside giving patient's history. Pt states that she was feeling awful and having nausea at home. Nephew reports that when HHA was home, pt had episode of diarrhea then had a few more episodes later in the day after HHA left. Nephew states that the pt called him and told him about her symptoms and he told her to call EMS to bring her to the ED. Nephew reports that the pt has been declining in the past few years and especially since she was hospitalized at [facility] in August for constipation, malnutrition and dehydration. Nephew also reports that pt had been in a nursing home then went home with a 5 hour a day HHA. Nephew does not feel that the pt has enough attention at home and is concerned that she needs more help. Nephew also reports that she has a poor appetite and limited PO intake, eating mostly cheese sandwiches at home.    In the ED, vitals were T 98, HR 63, /87, RR 18, spO2 95% on RA. Labs were remarkable for Eosinophils 6.3%, Cr 1.37, Alk Phos 127, UA with trace protein, large LE, many WBCs and bacteria. CTH was negative for acute hemorrhage or infarct. Pt was given 1g ceftriaxone, 1mg Ativan for agitation 4mg Zofran and 1L NS. (25 Jun 2019 11:38)                Vital Signs Last 24 Hrs  T(C): 36.8 (01 Jul 2019 08:32), Max: 36.9 (30 Jun 2019 21:32)  T(F): 98.2 (01 Jul 2019 08:32), Max: 98.4 (30 Jun 2019 21:32)  HR: 80 (01 Jul 2019 08:32) (75 - 81)  BP: 148/61 (01 Jul 2019 08:32) (146/52 - 158/84)  BP(mean): --  RR: 18 (01 Jul 2019 08:32) (16 - 18)  SpO2: 96% (01 Jul 2019 08:32) (96% - 96%)    MEDICATIONS  (STANDING):  aspirin enteric coated 81 milliGRAM(s) Oral daily  atorvastatin 20 milliGRAM(s) Oral at bedtime  FLUoxetine 50 milliGRAM(s) Oral daily  latanoprost 0.005% Ophthalmic Solution 1 Drop(s) Both EYES at bedtime    MEDICATIONS  (PRN):    Currently Undergoing Physical Therapy at bedside.    Functional Status Assessment:    Therapeutic Interventions      Bed Mobility  Bed Mobility Training Symptoms Noted During/After Treatment: tearful  Bed Mobility Training Rolling/Turning: stand-by assist;  1 person assist;  hand over hand;  set-up required;  bed rails;  verbal cues  Bed Mobility Training Scooting: moderate assist (50% patient effort);  1 person assist;  set-up required;  bed rails  Bed Mobility Training Supine-to-Sit: maximum assist (25% patient effort);  1 person assist;  bed rails;  set-up required  Bed Mobility Training Limitations: decreased ability to use arms for pushing/pulling;  decreased ability to use legs for bridging/pushing;  impaired ability to control trunk for mobility;  decreased strength;  impaired balance;  impaired postural control;  tearful, decreased participation    Sit-Stand Transfer Training  Sit-to-Stand Transfer Training Symptoms Noted During/After Treatment: none  Transfer Training Sit-to-Stand Transfer: moderate assist (50% patient effort);  1 person assist;  set-up required;  full weight-bearing   HHA  Transfer Training Stand-to-Sit Transfer: moderate assist (50% patient effort);  1 person assist;  set-up required;  verbal cues;  full weight-bearing   HHA  Sit-to-Stand Transfer Training Transfer Safety Analysis: decreased balance;  decreased step length;  decreased ROM;  decreased strength;  impaired balance;  impaired postural control    Gait Training  Gait Training Symptoms Noted During/After Treatment: tearful  Gait Training: minimum assist (75% patient effort);  1 person assist;  HHA;  15ft  Gait Analysis: 2-point gait   decreased shahab;  decreased step length;  decreased stride length;  shuffling;  reaching out for curtain/wall, tearful, fear of falling;  impaired balance;  impaired postural control;  decreased ROM;  decreased strength;  15 feet;  HHA, trunk support    Therapeutic Exercise  Therapeutic Exercise Detail: Bridging x5, SLR x5 each             PM&R Impression: as above    Disposition Plan Recommendations:  subacute rehab placement

## 2019-07-01 NOTE — PROGRESS NOTE ADULT - SUBJECTIVE AND OBJECTIVE BOX
Neurology Follow up note    Name  YOAV LEARY    HPI:  Patient is a 78yoF current smoker with pmhx dementia, depression, anxiety, suicided attempt in 2016, HLD, cataracts and glaucoma who presents from home, BIBEMS to [facility] ED for weakness and nausea. Patient's nephew (Willian Leary, HCP) is at bedside giving patient's history. Pt states that she was feeling awful and having nausea at home. Nephew reports that when HHA was home, pt had episode of diarrhea then had a few more episodes later in the day after HHA left. Nephew states that the pt called him and told him about her symptoms and he told her to call EMS to bring her to the ED. Nephew reports that the pt has been declining in the past few years and especially since she was hospitalized at [facility] in August for constipation, malnutrition and dehydration. Nephew also reports that pt had been in a nursing home then went home with a 5 hour a day HHA. Nephew does not feel that the pt has enough attention at home and is concerned that she needs more help. Nephew also reports that she has a poor appetite and limited PO intake, eating mostly cheese sandwiches at home.    In the ED, vitals were T 98, HR 63, /87, RR 18, spO2 95% on RA. Labs were remarkable for Eosinophils 6.3%, Cr 1.37, Alk Phos 127, UA with trace protein, large LE, many WBCs and bacteria. CTH was negative for acute hemorrhage or infarct. Pt was given 1g ceftriaxone, 1mg Ativan for agitation 4mg Zofran and 1L NS. (25 Jun 2019 11:38)      Interval History - confusion - oriented to person only        REVIEW OF SYSTEMS    Vital Signs Last 24 Hrs  T(C): 36.8 (01 Jul 2019 08:32), Max: 36.9 (30 Jun 2019 21:32)  T(F): 98.2 (01 Jul 2019 08:32), Max: 98.4 (30 Jun 2019 21:32)  HR: 80 (01 Jul 2019 08:32) (75 - 81)  BP: 148/61 (01 Jul 2019 08:32) (146/52 - 158/84)  BP(mean): --  RR: 18 (01 Jul 2019 08:32) (16 - 18)  SpO2: 96% (01 Jul 2019 08:32) (96% - 96%)    Physical Exam-     Mental Status- awake and alert    Cranial Nerves- full EOM    Gait and station- n/a    Motor- moves all 4 extremities    Reflexes- decreased    Sensation- no sensory level    Coordination- no tremors    Vascular - no bruits    Medications  aspirin enteric coated 81 milliGRAM(s) Oral daily  atorvastatin 20 milliGRAM(s) Oral at bedtime  FLUoxetine 50 milliGRAM(s) Oral daily  latanoprost 0.005% Ophthalmic Solution 1 Drop(s) Both EYES at bedtime      Lab      Radiology    Assessment- Dementia     Plan Psych consult

## 2019-07-01 NOTE — PROGRESS NOTE ADULT - SUBJECTIVE AND OBJECTIVE BOX
Incomplete    OVERNIGHT EVENTS: CHALO    SUBJECTIVE: Pt resting in bed. Says she still has some abdominal pain and nausea. Complains of some numbness in left arm that happens to her occasionally. Denies fever, chills, vomiting, chest pain, sob, changes in urination or BM. Had 1 BM yesterday.    T(F): 98.1 (07-01-19 @ 05:38)  HR: 81 (07-01-19 @ 05:38)  BP: 146/52 (07-01-19 @ 05:38)  RR: 18 (07-01-19 @ 05:38)  SpO2: 96% (07-01-19 @ 05:38)    PHYSICAL EXAM:  Constitutional:  Thin, elderly  female, NAD, leaning back on bed, slightly diaphoretic and warm to touch   Neck: Supple, no JVD  Respiratory: CTAB, no wheezing or crackles  Cardiovascular: RRR, no murmurs  Gastrointestinal: +BS, soft, no guarding or rebound tenderness, no palpable masses, no pain with deep palpation in all quadrants  Extremities: wwp; no cyanosis, clubbing or edema.   Vascular: Pulses equal and strong throughout.   Neurological: AAOx2 (doesn't know exact year  Skin: No gross skin abnormalities or rashes      LABS: No new labs            MEDICATIONS  (STANDING):  aspirin enteric coated 81 milliGRAM(s) Oral daily  atorvastatin 20 milliGRAM(s) Oral at bedtime  FLUoxetine 50 milliGRAM(s) Oral daily  latanoprost 0.005% Ophthalmic Solution 1 Drop(s) Both EYES at bedtime    MEDICATIONS  (PRN):

## 2019-07-01 NOTE — PROGRESS NOTE ADULT - PROBLEM SELECTOR PLAN 10
1) PCP Contacted on Admission: (Y/N) --> Name & Phone #: Felicitas is following  2) Date of Contact with PCP:  3) PCP Contacted at Discharge: (Y/N)  4) Summary of Handoff Given to PCP:   5) Post-Discharge Appointment Date and Location:
1) PCP Contacted on Admission: (Y/N) --> Name & Phone #: Felicitas is following  2) Date of Contact with PCP:  3) PCP Contacted at Discharge: (Y/N)  4) Summary of Handoff Given to PCP:   5) Post-Discharge Appointment Date and Location:
1) PCP Contacted on Admission: (Y/N) --> Name & Phone #:  2) Date of Contact with PCP:  3) PCP Contacted at Discharge: (Y/N)  4) Summary of Handoff Given to PCP:   5) Post-Discharge Appointment Date and Location:
1) PCP Contacted on Admission: (Y/N) --> Name & Phone #:  2) Date of Contact with PCP:  3) PCP Contacted at Discharge: (Y/N)  4) Summary of Handoff Given to PCP:   5) Post-Discharge Appointment Date and Location:
1) PCP Contacted on Admission: (Y/N) --> Name & Phone #: Felicitas is following  2) Date of Contact with PCP:  3) PCP Contacted at Discharge: (Y/N)  4) Summary of Handoff Given to PCP:   5) Post-Discharge Appointment Date and Location:

## 2019-07-01 NOTE — PROGRESS NOTE ADULT - PROBLEM SELECTOR PROBLEM 7
History of endarterectomy

## 2019-07-01 NOTE — PROGRESS NOTE ADULT - ASSESSMENT
Patient is a 78yoF current smoker with pmhx dementia, depression, anxiety, suicided attempt in 2016, HLD, cataracts and glaucoma who presents from home, BIBEMS to [facility] ED for weakness and nausea, admitted for management of UTI.
Patient is a 78yoF current smoker with pmhx dementia, depression, anxiety, suicided attempt in 2016, HLD, cataracts and glaucoma who presents from home, BIBEMS to [facility] ED for weakness and nausea, admitted for management of UTI.    Problem/Plan - 1:  ·  Problem: Acute cystitis without hematuria.  Plan: Patient presented with confusion and nausea. Pt denies dysuria or increased urinary frequency. Found to have UTI on UA. S/p 1L NS and 1g CTX in the ED.  - s/p 3 day course 1g CTX q24h (6/25 - 6/27)  - Urine culture with E. Coli.    Problem/Plan - 2:  ·  Problem: Confusion.  Plan: Patient presented with confusion in the setting of known dementia. Pt has returned to baseline mental status, per pt's nephew at bedside. CTH negative for acute hemorrhage or infarct.  - continue to monitor mental status.     Problem/Plan - 3:  ·  Problem: Failure to thrive in adult.  Plan: Pt's nephew reports poor PO intake and weight loss in the patient. Nephew reports that she eats very little. Pt has dry mucous membranes and is thin on exam.  - Nutrition consult  - c/w NS @ 75cc/hr.     Problem/Plan - 4:  ·  Problem: Depression.  Plan: Patient has history of depression. Nephew reports that pt had suicide attempt by overdose in 2016 for which she was hospitalized for 2 weeks. Patient currently takes Prozac 50mg at home. No SI/HI.  - c/w home Prozac 50mg QD.     Problem/Plan - 5:  ·  Problem: Dementia.  Plan: Patient has known history of dementia that is worsening per pt's nephew. Pt is A&Ox2 at baseline. Lives at home and has HHA 5 hours a day for 7 days.  - SW consult.     Problem/Plan - 6:  Problem: Glaucoma. Plan: c/w home Latanoprost ophthalmic solution.    Problem/Plan - 7:  ·  Problem: History of endarterectomy.  Plan: Nephew reports remote history of endarterectomy. Pt takes ASA 81mg and Atorvastatin 20mg at home.  - c/w home meds.     Problem/Plan - 8:  ·  Problem: Nutrition, metabolism, and development symptoms.  Plan: F: Not indicated  E: replete as needed  N: soft regular diet    code status: DNR.     Problem/Plan - 9:  ·  Problem: Prophylactic measure.  Plan: DVT: none (improve score 1)  GI: none    Dispo: RMF  DNR.
Patient is a 78yoF current smoker with pmhx dementia, depression, anxiety, suicided attempt in 2016, HLD, cataracts and glaucoma who presents from home, BIBEMS to [facility] ED for weakness and nausea, admitted for management of UTI.    Problem/Plan - 1:  ·  Problem: Acute cystitis without hematuria.  Plan: Patient presented with confusion and nausea. Pt denies dysuria or increased urinary frequency. Found to have UTI on UA. S/p 1L NS and 1g CTX in the ED.  - s/p 3 day course 1g CTX q24h (6/25 - 6/27)  -slightly warm to touch today and slightly diaphoretic-will do rectal temp  - Urine culture with E. Coli  -resolved pt has no urinary symptoms  -now awaiting placement for full time HHA w/ [facility] placement.     Problem/Plan - 2:  ·  Problem: Confusion.  Plan: Patient presented with confusion in the setting of known dementia. Pt has returned to baseline mental status, per pt's nephew at bedside. CTH negative for acute hemorrhage or infarct.  -Calm today  - continue to monitor mental status.     Problem/Plan - 3:  ·  Problem: Failure to thrive in adult.  Plan: Pt's nephew reports poor PO intake and weight loss in the patient. Nephew reports that she eats very little. Pt has dry mucous membranes and is thin on exam.  - Nutrition consult.     Problem/Plan - 4:  ·  Problem: Depression.  Plan: Patient has history of depression. Nephew reports that pt had suicide attempt by overdose in 2016 for which she was hospitalized for 2 weeks. Patient currently takes Prozac 50mg at home. No SI/HI.  - c/w home Prozac 50mg QD.     Problem/Plan - 5:  ·  Problem: Dementia.  Plan: Patient has known history of dementia that is worsening per pt's nephew. Pt is A&Ox2 at baseline. Lives at home and has HHA 5 hours a day for 7 days. Working on getting her a HHA for arounnd the clock.     Problem/Plan - 6:  Problem: Glaucoma. Plan: c/w home Latanoprost ophthalmic solution.    Problem/Plan - 7:  ·  Problem: History of endarterectomy.  Plan: Nephew reports remote history of endarterectomy. Pt takes ASA 81mg and Atorvastatin 20mg at home.  - c/w home meds.     Problem/Plan - 8:  ·  Problem: Nutrition, metabolism, and development symptoms.  Plan: F: Not indicated  E: replete as needed  N: soft regular diet    code status: DNR not DNI.
Patient is a 78yoF current smoker with pmhx dementia, depression, anxiety, suicided attempt in 2016, HLD, cataracts and glaucoma who presents from home, BIBEMS to [facility] ED for weakness and nausea, admitted for management of UTI.
Patient is a 78yoF current smoker with pmhx dementia, depression, anxiety, suicided attempt in 2016, HLD, cataracts and glaucoma who presents from home, BIBEMS to [facility] ED for weakness and nausea, admitted for management of UTI.
S/p UTI  repeat  U  a
c/o constipation     s.p UTI  confusion      cont Rx
Patient is a 78yoF current smoker with pmhx dementia, depression, anxiety, suicided attempt in 2016, HLD, cataracts and glaucoma who presents from home, BIBEMS to [facility] ED for weakness and nausea, admitted for management of UTI.
Patient is a 78yoF current smoker with pmhx dementia, depression, anxiety, suicided attempt in 2016, HLD, cataracts and glaucoma who presents from home, BIBEMS to [facility] ED for weakness and nausea, admitted for management of UTI.

## 2019-07-01 NOTE — PROGRESS NOTE ADULT - PROBLEM SELECTOR PLAN 1
Patient presented with confusion and nausea. Pt denies dysuria or increased urinary frequency. Found to have UTI on UA. S/p 1L NS and 1g CTX in the ED.  - s/p 3 day course 1g CTX q24h (6/25 - 6/27)  -slightly warm to touch today and slightly diaphoretic-will do rectal temp  - Urine culture with E. Coli  -resolved pt has no urinary symptoms  -now awaiting placement for full time HHA w/ [facility] placement

## 2019-07-01 NOTE — PROGRESS NOTE ADULT - PROBLEM SELECTOR PLAN 9
DVT: none (improve score 1)  GI: none    Dispo: RMF  DNR

## 2019-07-01 NOTE — PROGRESS NOTE ADULT - PROBLEM SELECTOR PLAN 3
Pt's nephew reports poor PO intake and weight loss in the patient. Nephew reports that she eats very little. Pt has dry mucous membranes and is thin on exam.  - Nutrition consult
Pt's nephew reports poor PO intake and weight loss in the patient. Nephew reports that she eats very little. Pt has dry mucous membranes and is thin on exam.  - Nutrition consult
Pt's nephew reports poor PO intake and weight loss in the patient. Nephew reports that she eats very little. Pt has dry mucous membranes and is thin on exam.  - Nutrition consult  - c/w NS @ 75cc/hr
Pt's nephew reports poor PO intake and weight loss in the patient. Nephew reports that she eats very little. Pt has dry mucous membranes and is thin on exam.  - Nutrition consult  - c/w NS @ 75cc/hr
Pt's nephew reports poor PO intake and weight loss in the patient. Nephew reports that she eats very little. Pt has dry mucous membranes and is thin on exam.  - Nutrition consult

## 2019-07-01 NOTE — PROGRESS NOTE ADULT - PROVIDER SPECIALTY LIST ADULT
Cardiology
Internal Medicine
Neurology
Rehab Medicine
Rehab Medicine
Internal Medicine
Internal Medicine

## 2019-07-01 NOTE — PROGRESS NOTE ADULT - SUBJECTIVE AND OBJECTIVE BOX
79 y/o female oms htn cigs cva carotid endarterectomy uti good progress comfortable lungs clear heart s1s2 2/6 shanel lpsb and apex bp 130/80 plan to transfer to snf discussed with all concerned

## 2019-07-01 NOTE — PROGRESS NOTE ADULT - PROBLEM SELECTOR PLAN 4
Patient has history of depression. Nephew reports that pt had suicide attempt by overdose in 2016 for which she was hospitalized for 2 weeks. Patient currently takes Prozac 50mg at home. No SI/HI.  - c/w home Prozac 50mg QD

## 2019-07-01 NOTE — CHART NOTE - NSCHARTNOTEFT_GEN_A_CORE
Admitting Diagnosis:   Patient is a 78y old  Female who presents with a chief complaint of UTI (01 Jul 2019 11:16)      PAST MEDICAL & SURGICAL HISTORY:  Glaucoma  Cataract  Tobacco use disorder  HLD (hyperlipidemia)  Anxiety  Depression  Dementia  H/O endarterectomy      Current Nutrition Order: Soft        PO Intake: Good (%) [   ]  Fair (50-75%) [  x ] Poor (<25%) [ x  ]    GI Issues: diarrhea, no n/v/c    Pain: appears to be in NAD     Skin Integrity: intact     Labs:  no new labs     CAPILLARY BLOOD GLUCOSE          Medications:  MEDICATIONS  (STANDING):  aspirin enteric coated 81 milliGRAM(s) Oral daily  atorvastatin 20 milliGRAM(s) Oral at bedtime  FLUoxetine 50 milliGRAM(s) Oral daily  latanoprost 0.005% Ophthalmic Solution 1 Drop(s) Both EYES at bedtime    MEDICATIONS  (PRN):      Weight: 45.9kg     Weight Change: none     Nutrition Focused Physical Exam: Completed [ x-6/26  ]  Not Pertinent [   ]    Estimated energy needs:   ABW used for calculations as pt between % of IBW.   ABW 49.9kg, IBW 50kg, 99% IBW, ht 62" (estimated, pt unable to state), BMI 20  Nutrient needs based on [facility] standards of care for maintenance in adults, adjusted for suspected malnutrition  1247-1497kcal (25-30kcal/kg)   49-59g pro (1-1.2g/kg pro)   1497- 1746ml (30-35ml/kg)     Subjective:   78F admitted for management of UTI, pt is current smoker with pmhx dementia, depression, anxiety, suicide attempt in 2016, HLD, cataracts and glaucoma who presents from home, BIBEMS to [facility] ED for weakness and nausea. Pt states that she was feeling awful and having nausea at home. Nephew reports that when HHA was home, pt had episode of diarrhea then had a few more episodes later in the day after HHA left. Nephew feels not enough attention at home and concerned she requires more help than she is currently receiving, additionally reports poor appetite at home/ FTT, baseline mental status AAOx2. Nephew again not present at time of visit, unable to obtain UBW and diet preferences as pt remains lethargic at this time, on 1:1 observation, still with poor appetite slowly improving. Now pending [facility] consult for dementia, becoming agitated intermittently per report, plan for SNF when stable. Recommend Ensure Enlive TID (1050 kcal, 60g protein, 540mL free H2O) to further meet needs, appetite stimulant and encouragement/ assistance at meals. Will continue to follow per protocol.     Previous Nutrition Diagnosis: Malnutrition r/t FTT AEB <75% EER being met >1 month, + noteable fat/ muscle wasting.     Active [ x  ]  Resolved [   ]    If resolved, new PES:     Goal: meet >75% EER as tolerated     Recommendations:  1. Add Ensure Enlive TID (1050 kcal, 60g protein, 540mL free H2O)   2. Manage pain prn  3. Monitor and replete lytes prn   4. Encourage intake and assist with feeds     Education: n/a mental status     Risk Level: High [  x ] Moderate [   ] Low [   ]

## 2019-07-08 DIAGNOSIS — N30.00 ACUTE CYSTITIS WITHOUT HEMATURIA: ICD-10-CM

## 2019-07-08 DIAGNOSIS — E43 UNSPECIFIED SEVERE PROTEIN-CALORIE MALNUTRITION: ICD-10-CM

## 2019-07-08 DIAGNOSIS — F03.90 UNSPECIFIED DEMENTIA WITHOUT BEHAVIORAL DISTURBANCE: ICD-10-CM

## 2019-07-08 DIAGNOSIS — F32.9 MAJOR DEPRESSIVE DISORDER, SINGLE EPISODE, UNSPECIFIED: ICD-10-CM

## 2019-07-08 DIAGNOSIS — E78.5 HYPERLIPIDEMIA, UNSPECIFIED: ICD-10-CM

## 2019-07-08 DIAGNOSIS — F41.9 ANXIETY DISORDER, UNSPECIFIED: ICD-10-CM

## 2019-07-08 DIAGNOSIS — F17.210 NICOTINE DEPENDENCE, CIGARETTES, UNCOMPLICATED: ICD-10-CM

## 2019-07-08 DIAGNOSIS — R62.7 ADULT FAILURE TO THRIVE: ICD-10-CM

## 2019-07-08 DIAGNOSIS — H40.9 UNSPECIFIED GLAUCOMA: ICD-10-CM

## 2022-03-15 NOTE — PROGRESS NOTE ADULT - PROBLEM SELECTOR PROBLEM 2
Confusion
Sski Pregnancy And Lactation Text: This medication is Pregnancy Category D and isn't considered safe during pregnancy. It is excreted in breast milk.

## 2022-10-05 NOTE — ED PROVIDER NOTE - PMH
PAST MEDICAL HISTORY:  Asthma no recent exacerbation, not using inhalers for a long time    Hypertension     Pancreatitis due to alcohol    
Anxiety    Cataract    Dementia    Depression    Glaucoma    HLD (hyperlipidemia)    Tobacco use disorder